# Patient Record
Sex: MALE | Employment: UNEMPLOYED | URBAN - METROPOLITAN AREA
[De-identification: names, ages, dates, MRNs, and addresses within clinical notes are randomized per-mention and may not be internally consistent; named-entity substitution may affect disease eponyms.]

---

## 2018-12-27 ENCOUNTER — APPOINTMENT (OUTPATIENT)
Dept: GENERAL RADIOLOGY | Age: 53
DRG: 897 | End: 2018-12-27
Attending: EMERGENCY MEDICINE
Payer: MEDICARE

## 2018-12-27 ENCOUNTER — APPOINTMENT (OUTPATIENT)
Dept: CT IMAGING | Age: 53
DRG: 897 | End: 2018-12-27
Attending: EMERGENCY MEDICINE
Payer: MEDICARE

## 2018-12-27 ENCOUNTER — HOSPITAL ENCOUNTER (INPATIENT)
Age: 53
LOS: 8 days | Discharge: NURSING, REHAB OR LONG-TERM CARE | DRG: 897 | End: 2019-01-04
Attending: EMERGENCY MEDICINE | Admitting: INTERNAL MEDICINE
Payer: MEDICARE

## 2018-12-27 DIAGNOSIS — R55 SYNCOPE AND COLLAPSE: Primary | ICD-10-CM

## 2018-12-27 DIAGNOSIS — G51.0 FACIAL NERVE PALSY: ICD-10-CM

## 2018-12-27 DIAGNOSIS — R56.9 SEIZURE-LIKE ACTIVITY (HCC): ICD-10-CM

## 2018-12-27 DIAGNOSIS — R79.89 ELEVATED LACTIC ACID LEVEL: ICD-10-CM

## 2018-12-27 DIAGNOSIS — K70.10 ALCOHOLIC HEPATITIS, UNSPECIFIED WHETHER ASCITES PRESENT: ICD-10-CM

## 2018-12-27 DIAGNOSIS — M62.82 NON-TRAUMATIC RHABDOMYOLYSIS: ICD-10-CM

## 2018-12-27 DIAGNOSIS — J96.01 ACUTE RESPIRATORY FAILURE WITH HYPOXIA (HCC): ICD-10-CM

## 2018-12-27 DIAGNOSIS — F10.921 ALCOHOL INTOXICATION WITH DELIRIUM (HCC): ICD-10-CM

## 2018-12-27 PROBLEM — F10.221 ALCOHOL INTOXICATION DELIRIUM WITH MODERATE OR SEVERE USE DISORDER (HCC): Status: ACTIVE | Noted: 2018-12-27

## 2018-12-27 PROBLEM — N18.30 STAGE 3 CHRONIC KIDNEY DISEASE (HCC): Status: ACTIVE | Noted: 2018-12-27

## 2018-12-27 LAB
ALBUMIN SERPL-MCNC: 3.7 G/DL (ref 3.5–5)
ALBUMIN/GLOB SERPL: 0.7 {RATIO} (ref 1.1–2.2)
ALP SERPL-CCNC: 76 U/L (ref 45–117)
ALT SERPL-CCNC: 46 U/L (ref 12–78)
AMPHET UR QL SCN: NEGATIVE
ANION GAP SERPL CALC-SCNC: 13 MMOL/L (ref 5–15)
APPEARANCE UR: CLEAR
AST SERPL-CCNC: 139 U/L (ref 15–37)
BACTERIA URNS QL MICRO: NEGATIVE /HPF
BARBITURATES UR QL SCN: NEGATIVE
BASOPHILS # BLD: 0 K/UL (ref 0–0.1)
BASOPHILS NFR BLD: 1 % (ref 0–1)
BENZODIAZ UR QL: NEGATIVE
BILIRUB SERPL-MCNC: 0.4 MG/DL (ref 0.2–1)
BILIRUB UR QL: NEGATIVE
BUN SERPL-MCNC: 14 MG/DL (ref 6–20)
BUN/CREAT SERPL: 11 (ref 12–20)
CALCIUM SERPL-MCNC: 8.8 MG/DL (ref 8.5–10.1)
CANNABINOIDS UR QL SCN: POSITIVE
CHLORIDE SERPL-SCNC: 96 MMOL/L (ref 97–108)
CK MB CFR SERPL CALC: 0.2 % (ref 0–2.5)
CK MB SERPL-MCNC: 2.4 NG/ML (ref 5–25)
CK SERPL-CCNC: 1273 U/L (ref 39–308)
CO2 SERPL-SCNC: 27 MMOL/L (ref 21–32)
COCAINE UR QL SCN: NEGATIVE
COLOR UR: ABNORMAL
CREAT SERPL-MCNC: 1.3 MG/DL (ref 0.7–1.3)
DIFFERENTIAL METHOD BLD: ABNORMAL
DRUG SCRN COMMENT,DRGCM: ABNORMAL
EOSINOPHIL # BLD: 0 K/UL (ref 0–0.4)
EOSINOPHIL NFR BLD: 0 % (ref 0–7)
EPITH CASTS URNS QL MICRO: ABNORMAL /LPF
ERYTHROCYTE [DISTWIDTH] IN BLOOD BY AUTOMATED COUNT: 15.4 % (ref 11.5–14.5)
ETHANOL SERPL-MCNC: 435 MG/DL
GLOBULIN SER CALC-MCNC: 5 G/DL (ref 2–4)
GLUCOSE SERPL-MCNC: 79 MG/DL (ref 65–100)
GLUCOSE UR STRIP.AUTO-MCNC: NEGATIVE MG/DL
HCT VFR BLD AUTO: 34.4 % (ref 36.6–50.3)
HGB BLD-MCNC: 11.5 G/DL (ref 12.1–17)
HGB UR QL STRIP: ABNORMAL
IMM GRANULOCYTES # BLD: 0 K/UL (ref 0–0.04)
IMM GRANULOCYTES NFR BLD AUTO: 0 % (ref 0–0.5)
KETONES UR QL STRIP.AUTO: NEGATIVE MG/DL
LACTATE SERPL-SCNC: 2.6 MMOL/L (ref 0.4–2)
LACTATE SERPL-SCNC: 3.2 MMOL/L (ref 0.4–2)
LACTATE SERPL-SCNC: 4.2 MMOL/L (ref 0.4–2)
LEUKOCYTE ESTERASE UR QL STRIP.AUTO: NEGATIVE
LIPASE SERPL-CCNC: 122 U/L (ref 73–393)
LYMPHOCYTES # BLD: 1.2 K/UL (ref 0.8–3.5)
LYMPHOCYTES NFR BLD: 44 % (ref 12–49)
MAGNESIUM SERPL-MCNC: 1.6 MG/DL (ref 1.6–2.4)
MCH RBC QN AUTO: 29.9 PG (ref 26–34)
MCHC RBC AUTO-ENTMCNC: 33.4 G/DL (ref 30–36.5)
MCV RBC AUTO: 89.4 FL (ref 80–99)
METHADONE UR QL: NEGATIVE
MONOCYTES # BLD: 0.4 K/UL (ref 0–1)
MONOCYTES NFR BLD: 13 % (ref 5–13)
NEUTS SEG # BLD: 1.2 K/UL (ref 1.8–8)
NEUTS SEG NFR BLD: 42 % (ref 32–75)
NITRITE UR QL STRIP.AUTO: NEGATIVE
NRBC # BLD: 0 K/UL (ref 0–0.01)
NRBC BLD-RTO: 0 PER 100 WBC
OPIATES UR QL: NEGATIVE
PCP UR QL: NEGATIVE
PH UR STRIP: 5 [PH] (ref 5–8)
PLATELET # BLD AUTO: 151 K/UL (ref 150–400)
POTASSIUM SERPL-SCNC: 4 MMOL/L (ref 3.5–5.1)
PROT SERPL-MCNC: 8.7 G/DL (ref 6.4–8.2)
PROT UR STRIP-MCNC: 100 MG/DL
RBC # BLD AUTO: 3.85 M/UL (ref 4.1–5.7)
RBC #/AREA URNS HPF: ABNORMAL /HPF (ref 0–5)
SODIUM SERPL-SCNC: 136 MMOL/L (ref 136–145)
SP GR UR REFRACTOMETRY: 1.01 (ref 1–1.03)
TROPONIN I SERPL-MCNC: <0.05 NG/ML
UA: UC IF INDICATED,UAUC: ABNORMAL
UROBILINOGEN UR QL STRIP.AUTO: 0.2 EU/DL (ref 0.2–1)
WBC # BLD AUTO: 2.8 K/UL (ref 4.1–11.1)
WBC URNS QL MICRO: ABNORMAL /HPF (ref 0–4)

## 2018-12-27 PROCEDURE — 94761 N-INVAS EAR/PLS OXIMETRY MLT: CPT

## 2018-12-27 PROCEDURE — 81001 URINALYSIS AUTO W/SCOPE: CPT

## 2018-12-27 PROCEDURE — 74011250636 HC RX REV CODE- 250/636: Performed by: INTERNAL MEDICINE

## 2018-12-27 PROCEDURE — 83735 ASSAY OF MAGNESIUM: CPT

## 2018-12-27 PROCEDURE — 83605 ASSAY OF LACTIC ACID: CPT

## 2018-12-27 PROCEDURE — 77010033678 HC OXYGEN DAILY

## 2018-12-27 PROCEDURE — 82550 ASSAY OF CK (CPK): CPT

## 2018-12-27 PROCEDURE — 74011250636 HC RX REV CODE- 250/636: Performed by: EMERGENCY MEDICINE

## 2018-12-27 PROCEDURE — 96361 HYDRATE IV INFUSION ADD-ON: CPT

## 2018-12-27 PROCEDURE — 70450 CT HEAD/BRAIN W/O DYE: CPT

## 2018-12-27 PROCEDURE — 83690 ASSAY OF LIPASE: CPT

## 2018-12-27 PROCEDURE — 65660000001 HC RM ICU INTERMED STEPDOWN

## 2018-12-27 PROCEDURE — 84484 ASSAY OF TROPONIN QUANT: CPT

## 2018-12-27 PROCEDURE — 96375 TX/PRO/DX INJ NEW DRUG ADDON: CPT

## 2018-12-27 PROCEDURE — 36415 COLL VENOUS BLD VENIPUNCTURE: CPT

## 2018-12-27 PROCEDURE — 99285 EMERGENCY DEPT VISIT HI MDM: CPT

## 2018-12-27 PROCEDURE — 96365 THER/PROPH/DIAG IV INF INIT: CPT

## 2018-12-27 PROCEDURE — 96366 THER/PROPH/DIAG IV INF ADDON: CPT

## 2018-12-27 PROCEDURE — 74011000250 HC RX REV CODE- 250: Performed by: EMERGENCY MEDICINE

## 2018-12-27 PROCEDURE — 85025 COMPLETE CBC W/AUTO DIFF WBC: CPT

## 2018-12-27 PROCEDURE — 82553 CREATINE MB FRACTION: CPT

## 2018-12-27 PROCEDURE — 80307 DRUG TEST PRSMV CHEM ANLYZR: CPT

## 2018-12-27 PROCEDURE — 93005 ELECTROCARDIOGRAM TRACING: CPT

## 2018-12-27 PROCEDURE — 71045 X-RAY EXAM CHEST 1 VIEW: CPT

## 2018-12-27 PROCEDURE — 80053 COMPREHEN METABOLIC PANEL: CPT

## 2018-12-27 PROCEDURE — 74011250637 HC RX REV CODE- 250/637: Performed by: INTERNAL MEDICINE

## 2018-12-27 RX ORDER — LORAZEPAM 2 MG/ML
4 INJECTION INTRAMUSCULAR
Status: DISCONTINUED | OUTPATIENT
Start: 2018-12-27 | End: 2019-01-04 | Stop reason: HOSPADM

## 2018-12-27 RX ORDER — LORAZEPAM 2 MG/ML
2 INJECTION INTRAMUSCULAR
Status: DISCONTINUED | OUTPATIENT
Start: 2018-12-27 | End: 2019-01-04 | Stop reason: HOSPADM

## 2018-12-27 RX ORDER — LANOLIN ALCOHOL/MO/W.PET/CERES
100 CREAM (GRAM) TOPICAL DAILY
Status: DISCONTINUED | OUTPATIENT
Start: 2018-12-28 | End: 2019-01-04 | Stop reason: HOSPADM

## 2018-12-27 RX ORDER — THERA TABS 400 MCG
1 TAB ORAL DAILY
Status: DISCONTINUED | OUTPATIENT
Start: 2018-12-28 | End: 2019-01-04 | Stop reason: HOSPADM

## 2018-12-27 RX ORDER — FOLIC ACID 1 MG/1
1 TABLET ORAL DAILY
Status: DISCONTINUED | OUTPATIENT
Start: 2018-12-28 | End: 2019-01-04 | Stop reason: HOSPADM

## 2018-12-27 RX ORDER — GABAPENTIN 300 MG/1
300 CAPSULE ORAL 3 TIMES DAILY
Status: DISCONTINUED | OUTPATIENT
Start: 2018-12-27 | End: 2019-01-04 | Stop reason: HOSPADM

## 2018-12-27 RX ORDER — KETOROLAC TROMETHAMINE 30 MG/ML
15 INJECTION, SOLUTION INTRAMUSCULAR; INTRAVENOUS
Status: COMPLETED | OUTPATIENT
Start: 2018-12-27 | End: 2018-12-27

## 2018-12-27 RX ORDER — SODIUM CHLORIDE 9 MG/ML
100 INJECTION, SOLUTION INTRAVENOUS CONTINUOUS
Status: DISCONTINUED | OUTPATIENT
Start: 2018-12-27 | End: 2018-12-29

## 2018-12-27 RX ORDER — SODIUM CHLORIDE 0.9 % (FLUSH) 0.9 %
5-10 SYRINGE (ML) INJECTION EVERY 8 HOURS
Status: DISCONTINUED | OUTPATIENT
Start: 2018-12-27 | End: 2019-01-04 | Stop reason: HOSPADM

## 2018-12-27 RX ORDER — SODIUM CHLORIDE 0.9 % (FLUSH) 0.9 %
5-10 SYRINGE (ML) INJECTION AS NEEDED
Status: DISCONTINUED | OUTPATIENT
Start: 2018-12-27 | End: 2019-01-04 | Stop reason: HOSPADM

## 2018-12-27 RX ORDER — ENOXAPARIN SODIUM 100 MG/ML
40 INJECTION SUBCUTANEOUS EVERY 24 HOURS
Status: DISCONTINUED | OUTPATIENT
Start: 2018-12-27 | End: 2019-01-04 | Stop reason: HOSPADM

## 2018-12-27 RX ADMIN — LORAZEPAM 2 MG: 2 INJECTION, SOLUTION INTRAMUSCULAR; INTRAVENOUS at 20:58

## 2018-12-27 RX ADMIN — LORAZEPAM 2 MG: 2 INJECTION, SOLUTION INTRAMUSCULAR; INTRAVENOUS at 23:59

## 2018-12-27 RX ADMIN — SODIUM CHLORIDE 1000 ML: 900 INJECTION, SOLUTION INTRAVENOUS at 10:44

## 2018-12-27 RX ADMIN — GABAPENTIN 300 MG: 300 CAPSULE ORAL at 16:04

## 2018-12-27 RX ADMIN — KETOROLAC TROMETHAMINE 15 MG: 30 INJECTION, SOLUTION INTRAMUSCULAR; INTRAVENOUS at 11:29

## 2018-12-27 RX ADMIN — ENOXAPARIN SODIUM 40 MG: 40 INJECTION, SOLUTION INTRAVENOUS; SUBCUTANEOUS at 20:26

## 2018-12-27 RX ADMIN — THIAMINE HYDROCHLORIDE: 100 INJECTION, SOLUTION INTRAMUSCULAR; INTRAVENOUS at 11:55

## 2018-12-27 RX ADMIN — GABAPENTIN 300 MG: 300 CAPSULE ORAL at 20:26

## 2018-12-27 RX ADMIN — SODIUM CHLORIDE 100 ML/HR: 900 INJECTION, SOLUTION INTRAVENOUS at 20:26

## 2018-12-27 RX ADMIN — Medication 10 ML: at 20:13

## 2018-12-27 RX ADMIN — SODIUM CHLORIDE 1000 ML: 900 INJECTION, SOLUTION INTRAVENOUS at 15:24

## 2018-12-27 NOTE — ED NOTES
Attempted to call report to [address], RN on second floor. Nurse unable to take report at this time.

## 2018-12-27 NOTE — H&P
Hospitalist Admission Note    NAME: Cortney Nunes   :  1965   MRN:  [id number]   Room Number: ER06/06  @ [facility]     Date/Time:  2018 3:04 PM    Patient PCP: Wali Victor MD  ______________________________________________________________________  Given the patient's current clinical presentation, I have a high level of concern for decompensation if discharged from the emergency department. Complex decision making was performed, which includes reviewing the patient's available past medical records, laboratory results, and x-ray films. My assessment of this patient's clinical condition and my plan of care is as follows. Assessment / Plan:    Alcohol intoxication delirium with moderate or severe use disorder:POA  Alcohol hepatitis  CIWA protocol, MVI  Gabapentin for augmentation  Will  when patient is more alert/awake  Risk for alcohol withdrawal seizures  PRN Ativan    Syncopal episode  Multiple falls per family  Mild left nasolabial droop, unclear whether this is patient's baseline or something new  Head CT- unremarkable  Monitor on telemetry,check orthostatics  Consult neurology  Defer MRI/MRA to neurology    Lactic acidosis/starvation ketosis  LA 4.3,c/w ivf  Recheck LA    Nontraumatic rhabdomyolysis  Monitor CK, aggressive fluid hydration    CKD, stage III  Avoid nephrotoxins    Marijuana abuse  UDS positive for THC     Body mass index is 25.4 kg/m². Code Status: full  Surrogate Decision Maker:niece    DVT Prophylaxis: Lovenox  GI Prophylaxis: not indicated    Baseline: lives with sister        Subjective:   CHIEF COMPLAINT: falls    HISTORY OF PRESENT ILLNESS:     Maciej Wolf is a 52 y.o. male with PMH of alcohol dependence  who presents to ED with c/o above. Please note patient is a poor historian and history was mainly taken from ED physician and niece at bedside.   Per report patient has been having chronic pain all over and witnessed syncopal episodes twice in the past few weeks. Niece declines noticing any chronic jerking movements, bowel or bladder incontinence or tongue bite. During my interview, patient does admit to drinking 4-5 beers every day with the last drink this morning. He denies smoking cigarettes but does smoke marijuana. He denies fever or chills. He does have a remote history of alcohol withdrawal seizures. In ED, CT head was done which was unremarkable labs were remarkable for elevated CK and lactic acidosis. We were asked to admit for work up and evaluation of the above problems. Past Medical History:   Diagnosis Date    Alcohol abuse     Asthma     Hypertension         History reviewed. No pertinent surgical history. Social History     Tobacco Use    Smoking status: Current Every Day Smoker    Smokeless tobacco: Never Used   Substance Use Topics    Alcohol use: Yes        History reviewed. No pertinent family history. Allergies   Allergen Reactions    Aspirin Nausea and Vomiting        Prior to Admission medications    Medication Sig Start Date End Date Taking? Authorizing Provider   ATORVASTATIN CALCIUM (LIPITOR PO) Take  by mouth. Uli Argueta MD   POTASSIUM (POTASSIMIN PO) Take  by mouth. Uli Argueta MD       REVIEW OF SYSTEMS:     I am not able to complete the review of systems because:    The patient is intubated and sedated    The patient has altered mental status due to his acute medical problems    The patient has baseline aphasia from prior stroke(s)    The patient has baseline dementia and is not reliable historian    The patient is in acute medical distress and unable to provide information           Total of 12 systems reviewed as follows:       POSITIVE= underlined text  Negative = text not underlined  General:  fever, chills, sweats, generalized weakness, weight loss/gain,      loss of appetite   Eyes:    blurred vision, eye pain, loss of vision, double vision  ENT:    rhinorrhea, pharyngitis   Respiratory:   cough, sputum production, SOB, ACEVEDO, wheezing, pleuritic pain   Cardiology:   chest pain, palpitations, orthopnea, PND, edema, syncope   Gastrointestinal:  abdominal pain , N/V, diarrhea, dysphagia, constipation, bleeding   Genitourinary:  frequency, urgency, dysuria, hematuria, incontinence   Muskuloskeletal :  arthralgia, myalgia, back pain  Hematology:  easy bruising, nose or gum bleeding, lymphadenopathy   Dermatological: rash, ulceration, pruritis, color change / jaundice  Endocrine:   hot flashes or polydipsia   Neurological:  headache, dizziness, confusion, focal weakness, paresthesia,     Speech difficulties, memory loss, gait difficulty  Psychological: Feelings of anxiety, depression, agitation    Objective:   VITALS:    Visit Vitals  /83 (BP 1 Location: Right arm, BP Patient Position: At rest)   Pulse 82   Temp 98.2 °F (36.8 °C)   Resp 16   Ht 5' 9\" (1.753 m)   Wt 78 kg (172 lb)   SpO2 100%   BMI 25.40 kg/m²       PHYSICAL EXAM:    General:    Alert, cooperative, no distress, appears stated age. HEENT: Atraumatic, anicteric sclerae, pink conjunctivae     No oral ulcers, mucosa moist, throat clear, dentition fair  Neck:  Supple, symmetrical,  thyroid: non tender  Lungs:   Clear to auscultation bilaterally. No Wheezing or Rhonchi. No rales. Chest wall:  No tenderness  No Accessory muscle use. Heart:   Regular  rhythm,  No  murmur   No edema  Abdomen:   Soft, non-tender. Not distended. Bowel sounds normal,umblical hernia+  Extremities: No cyanosis. No clubbing,      Skin turgor normal, Capillary refill normal, Radial dial pulse 2+  Skin:     Not pale. Not Jaundiced  No rashes   Psych:  poor insight. Not depressed. Not anxious or agitated. Neurologic: EOMs intact. No facial asymmetry. No aphasia or slurred speech. Symmetrical strength, Sensation grossly intact. Alert and oriented X 2. Mild left nasolabial drop?  At baseline    ______________________________________________________________________    Care Plan discussed with:  Patient/Family, Nurse and     Expected  Disposition:  tbd  ________________________________________________________________________  TOTAL TIME:  78 Minutes    Critical Care Provided     Minutes non procedure based      Comments    x Reviewed previous records   >50% of visit spent in counseling and coordination of care  Discussion with patient and/or family and questions answered       ________________________________________________________________________  Signed: Bryan Douglas MD    Procedures: see electronic medical records for all procedures/Xrays and details which were not copied into this note but were reviewed prior to creation of Plan.     LAB DATA REVIEWED:    Recent Results (from the past 24 hour(s))   EKG, 12 LEAD, INITIAL    Collection Time: 12/27/18 10:32 AM   Result Value Ref Range    Ventricular Rate 93 BPM    Atrial Rate 93 BPM    P-R Interval 180 ms    QRS Duration 80 ms    Q-T Interval 376 ms    QTC Calculation (Bezet) 467 ms    Calculated P Axis 40 degrees    Calculated R Axis 40 degrees    Calculated T Axis 52 degrees    Diagnosis       Normal sinus rhythm  Normal ECG  When compared with ECG of 02-AUG-2010 08:06,  ST no longer depressed in Anterior leads  Nonspecific T wave abnormality no longer evident in Inferior leads     CBC WITH AUTOMATED DIFF    Collection Time: 12/27/18 10:39 AM   Result Value Ref Range    WBC 2.8 (L) 4.1 - 11.1 K/uL    RBC 3.85 (L) 4.10 - 5.70 M/uL    HGB 11.5 (L) 12.1 - 17.0 g/dL    HCT 34.4 (L) 36.6 - 50.3 %    MCV 89.4 80.0 - 99.0 FL    MCH 29.9 26.0 - 34.0 PG    MCHC 33.4 30.0 - 36.5 g/dL    RDW 15.4 (H) 11.5 - 14.5 %    PLATELET 234 250 - 327 K/uL    NRBC 0.0 0  WBC    ABSOLUTE NRBC 0.00 0.00 - 0.01 K/uL    NEUTROPHILS 42 32 - 75 %    LYMPHOCYTES 44 12 - 49 %    MONOCYTES 13 5 - 13 %    EOSINOPHILS 0 0 - 7 %    BASOPHILS 1 0 - 1 % IMMATURE GRANULOCYTES 0 0.0 - 0.5 %    ABS. NEUTROPHILS 1.2 (L) 1.8 - 8.0 K/UL    ABS. LYMPHOCYTES 1.2 0.8 - 3.5 K/UL    ABS. MONOCYTES 0.4 0.0 - 1.0 K/UL    ABS. EOSINOPHILS 0.0 0.0 - 0.4 K/UL    ABS. BASOPHILS 0.0 0.0 - 0.1 K/UL    ABS. IMM. GRANS. 0.0 0.00 - 0.04 K/UL    DF AUTOMATED     METABOLIC PANEL, COMPREHENSIVE    Collection Time: 12/27/18 10:39 AM   Result Value Ref Range    Sodium 136 136 - 145 mmol/L    Potassium 4.0 3.5 - 5.1 mmol/L    Chloride 96 (L) 97 - 108 mmol/L    CO2 27 21 - 32 mmol/L    Anion gap 13 5 - 15 mmol/L    Glucose 79 65 - 100 mg/dL    BUN 14 6 - 20 MG/DL    Creatinine 1.30 0.70 - 1.30 MG/DL    BUN/Creatinine ratio 11 (L) 12 - 20      GFR est AA >60 >60 ml/min/1.73m2    GFR est non-AA 57 (L) >60 ml/min/1.73m2    Calcium 8.8 8.5 - 10.1 MG/DL    Bilirubin, total 0.4 0.2 - 1.0 MG/DL    ALT (SGPT) 46 12 - 78 U/L    AST (SGOT) 139 (H) 15 - 37 U/L    Alk.  phosphatase 76 45 - 117 U/L    Protein, total 8.7 (H) 6.4 - 8.2 g/dL    Albumin 3.7 3.5 - 5.0 g/dL    Globulin 5.0 (H) 2.0 - 4.0 g/dL    A-G Ratio 0.7 (L) 1.1 - 2.2     LIPASE    Collection Time: 12/27/18 10:39 AM   Result Value Ref Range    Lipase 122 73 - 393 U/L   ETHYL ALCOHOL    Collection Time: 12/27/18 10:39 AM   Result Value Ref Range    ALCOHOL(ETHYL),SERUM 435 (H) <10 MG/DL   DRUG SCREEN, URINE    Collection Time: 12/27/18 10:39 AM   Result Value Ref Range    AMPHETAMINES NEGATIVE  NEG      BARBITURATES NEGATIVE  NEG      BENZODIAZEPINES NEGATIVE  NEG      COCAINE NEGATIVE  NEG      METHADONE NEGATIVE  NEG      OPIATES NEGATIVE  NEG      PCP(PHENCYCLIDINE) NEGATIVE  NEG      THC (TH-CANNABINOL) POSITIVE (A) NEG      Drug screen comment (NOTE)    URINALYSIS W/ REFLEX CULTURE    Collection Time: 12/27/18 10:39 AM   Result Value Ref Range    Color YELLOW/STRAW      Appearance CLEAR CLEAR      Specific gravity 1.010 1.003 - 1.030      pH (UA) 5.0 5.0 - 8.0      Protein 100 (A) NEG mg/dL    Glucose NEGATIVE  NEG mg/dL    Ketone NEGATIVE  NEG mg/dL    Bilirubin NEGATIVE  NEG      Blood TRACE (A) NEG      Urobilinogen 0.2 0.2 - 1.0 EU/dL    Nitrites NEGATIVE  NEG      Leukocyte Esterase NEGATIVE  NEG      WBC 0-4 0 - 4 /hpf    RBC 0-5 0 - 5 /hpf    Epithelial cells FEW FEW /lpf    Bacteria NEGATIVE  NEG /hpf    UA:UC IF INDICATED CULTURE NOT INDICATED BY UA RESULT CNI     TROPONIN I    Collection Time: 12/27/18 10:39 AM   Result Value Ref Range    Troponin-I, Qt. <0.05 <0.05 ng/mL   LACTIC ACID    Collection Time: 12/27/18 10:39 AM   Result Value Ref Range    Lactic acid 4.2 (HH) 0.4 - 2.0 MMOL/L   CK W/ REFLX CKMB    Collection Time: 12/27/18 10:39 AM   Result Value Ref Range    CK 1,273 (H) 39 - 308 U/L   CK-MB,QUANT.     Collection Time: 12/27/18 10:39 AM   Result Value Ref Range    CK - MB 2.4 <3.6 NG/ML    CK-MB Index 0.2 0.0 - 2.5

## 2018-12-27 NOTE — ED TRIAGE NOTES
C/o chronic \"pain all over\" x \"since I can remember\" with possible fall/syncope episodes x 2-3 weeks. Pt reported \"sometimes I almost pass out when i'm in pain and trying to crawl up the stairs. \" also pt's niece reported that \"your uncle is passed out\" after hanging with friends.

## 2018-12-27 NOTE — ROUTINE PROCESS
TRANSFER - OUT REPORT:    Verbal report given to REAGAN Zazueta(name) on Hari Dubose  being transferred to PCU 3(unit) for routine progression of care       Report consisted of patients Situation, Background, Assessment and   Recommendations(SBAR). Information from the following report(s) SBAR, ED Summary, [facility] and Recent Results was reviewed with the receiving nurse. Lines:   Peripheral IV 12/27/18 Right Antecubital (Active)   Site Assessment Clean, dry, & intact 12/27/2018 10:37 AM   Phlebitis Assessment 0 12/27/2018 10:37 AM   Infiltration Assessment 0 12/27/2018 10:37 AM   Dressing Status Clean, dry, & intact 12/27/2018 10:37 AM   Dressing Type Transparent 12/27/2018 10:37 AM   Hub Color/Line Status Pink;Flushed 12/27/2018 10:37 AM   Action Taken Blood drawn 12/27/2018 10:37 AM        Opportunity for questions and clarification was provided.       Patient transported with:   Registered Nurse

## 2018-12-27 NOTE — PROGRESS NOTES
CM reviewed chart. CM met with pt for assessment. Pt lives with his niece Tavares Pelaez, who also makes his medical decisions in case of an emergency- per pt, at [facility] [place], [place]. Pt arrived to the ED by his great niece, Norman Nunes [phone]- she can provide phone number for her mother, JUAN IBARRA. Pt reports he has not seen his PCP in years- is willing to have a new PCP set up here at [facility]. Pt has done pt in the past at [facility]- years ago. Pt confirmed he is independent at home.      Care Management Interventions  PCP Verified by CM: Yes(hasn't seen in years )  Transition of Care Consult (CM Consult): Discharge Planning  Current Support Network: Relative's Home(pt lives with bishop and great niece )  Confirm Follow Up Transport: Family(Niece/ great niece will  )  Discharge Location  Discharge Placement: Transferred to higher level of care      [facility] MSW, QMHP

## 2018-12-27 NOTE — ED NOTES
Pt reports chronic generalized body pain with possible syncopal episodes over past few weeks and worsening. Pt also admits to drinking beer. Pt has bruising with abrasion noted to bridge of nose.

## 2018-12-27 NOTE — ED NOTES
Emergency Department Nursing Plan of Care       The Nursing Plan of Care is developed from the Nursing assessment and Emergency Department Attending provider initial evaluation. The plan of care may be reviewed in the ED Provider note.     The Plan of Care was developed with the following considerations:   Patient / Family readiness to learn indicated by:verbalized understanding  Persons(s) to be included in education: patient  Barriers to Learning/Limitations:No    Signed     Yin Phan    12/27/2018   9:48 AM.

## 2018-12-27 NOTE — ED PROVIDER NOTES
EMERGENCY DEPARTMENT HISTORY AND PHYSICAL EXAM      Date: 12/27/2018  Patient Name: Burgess Scruggs  History of Presenting Illness     Chief Complaint   Patient presents with    Fall    Generalized Body Aches       History Provided By: Patient and Patient's niece    HPI: Burgess Scruggs, 52 y.o. male with PMHx significant for HTN, EtOH abuse, asthma, presents ambulatory to the ED with cc of chronic \"pain all over\" as well as possible GLF's/syncopal episodes x 3 weeks. Per niece, the pt currently lives with her mother who reported that the pt has been \"randomly passing out. \" When asking pt why he is in ED he states \"I'm here for pain and dysfunction. \" Niece reports pt is able to ambulate. She notes that the pt drinks alcohol \"a few times a week. \" Per niece, the pt has not taken any medication for his current pain. She endorses that the pt has had \"1 or 2 strokes. \" Additionally, pt specifically denies any recent fever, chills, headache, nausea, vomiting, diarrhea, abdominal pain, CP, SOB, lightheadedness, dizziness, numbness, weakness, tingling, BLE swelling, heart palpitations, urinary sxs, changes in BM, changes in PO intake, melena, hematochezia, cough, or congestion. Of note, pt and pt's niece are poor historians. History limited due to patient's acute delirium and alcohol intoxication. PCP: Maico Kumar MD    PMHx: Significant for HTN, EtOH abuse, asthma  Social Hx: + tobacco (daily), + EtOH, - Illicit Drugs       Past History   Past Medical History:  Past Medical History:   Diagnosis Date    Alcohol abuse     Asthma     Hypertension        Past Surgical History:  History reviewed. No pertinent surgical history. Family History:  History reviewed. No pertinent family history. Social History:  Social History     Tobacco Use    Smoking status: Current Every Day Smoker    Smokeless tobacco: Never Used   Substance Use Topics    Alcohol use: Yes    Drug use:  No Allergies: Allergies   Allergen Reactions    Aspirin Nausea and Vomiting       Medications:  No current facility-administered medications on file prior to encounter. Current Outpatient Medications on File Prior to Encounter   Medication Sig Dispense Refill    ATORVASTATIN CALCIUM (LIPITOR PO) Take  by mouth.  POTASSIUM (POTASSIMIN PO) Take  by mouth. Review of Systems   Review of Systems   Constitutional: Negative. Negative for chills and fever. HENT: Negative. Negative for congestion, facial swelling, rhinorrhea, sore throat, trouble swallowing and voice change. Eyes: Negative. Respiratory: Negative. Negative for apnea, cough, chest tightness, shortness of breath and wheezing. Cardiovascular: Negative. Negative for chest pain, palpitations and leg swelling. Gastrointestinal: Negative. Negative for abdominal distention, abdominal pain, blood in stool, constipation, diarrhea, nausea and vomiting. Endocrine: Negative. Negative for cold intolerance, heat intolerance and polyuria. Genitourinary: Negative. Negative for difficulty urinating, dysuria, flank pain, frequency, hematuria and urgency. Musculoskeletal: Positive for myalgias. Negative for arthralgias, back pain, neck pain and neck stiffness. Skin: Negative. Negative for color change and rash. Neurological: Positive for syncope. Negative for dizziness, facial asymmetry, speech difficulty, weakness, light-headedness, numbness and headaches. Hematological: Negative. Does not bruise/bleed easily. Psychiatric/Behavioral: Negative. Negative for confusion and self-injury. The patient is not nervous/anxious. Physical Exam   Physical Exam   Constitutional: He appears lethargic. He appears toxic. He has a sickly appearance. He appears ill. He appears distressed. HENT:   Head: Normocephalic and atraumatic. Mouth/Throat: Mucous membranes are normal. No posterior oropharyngeal erythema.    Nose: Abrasion over dorsum of nose  Mouth/throat: No ecchymosis to tongue   Eyes: Conjunctivae and EOM are normal. Pupils are equal, round, and reactive to light. Neck: Normal range of motion. Cardiovascular: Normal rate, regular rhythm, normal heart sounds and intact distal pulses. Exam reveals no gallop and no friction rub. No murmur heard. Pulmonary/Chest: Effort normal and breath sounds normal. No respiratory distress. He has no wheezes. He has no rales. He exhibits no tenderness. Abdominal: Soft. Bowel sounds are normal. He exhibits no distension and no mass. There is no tenderness. There is no rebound and no guarding. A hernia is present. Reducible umbilical hernia   Musculoskeletal: Normal range of motion. He exhibits no edema, tenderness or deformity. Neurological: He appears lethargic. He displays normal reflexes. No cranial nerve deficit. He exhibits normal muscle tone. Coordination normal.   Follows commands intermittently, baseline mental exam per niece, left facial droop, facial palsy involves left upper brow and nasal groove; equal 5/5 strength BUE, BLE   Skin: Skin is warm. No rash noted. Psychiatric: He has a normal mood and affect. Nursing note and vitals reviewed.     Diagnostic Study Results     Labs -  Recent Results (from the past 24 hour(s))   EKG, 12 LEAD, INITIAL    Collection Time: 12/27/18 10:32 AM   Result Value Ref Range    Ventricular Rate 93 BPM    Atrial Rate 93 BPM    P-R Interval 180 ms    QRS Duration 80 ms    Q-T Interval 376 ms    QTC Calculation (Bezet) 467 ms    Calculated P Axis 40 degrees    Calculated R Axis 40 degrees    Calculated T Axis 52 degrees    Diagnosis       Normal sinus rhythm  Normal ECG  When compared with ECG of 02-AUG-2010 08:06,  ST no longer depressed in Anterior leads  Nonspecific T wave abnormality no longer evident in Inferior leads     CBC WITH AUTOMATED DIFF    Collection Time: 12/27/18 10:39 AM   Result Value Ref Range    WBC 2.8 (L) 4.1 - 11.1 K/uL RBC 3.85 (L) 4.10 - 5.70 M/uL    HGB 11.5 (L) 12.1 - 17.0 g/dL    HCT 34.4 (L) 36.6 - 50.3 %    MCV 89.4 80.0 - 99.0 FL    MCH 29.9 26.0 - 34.0 PG    MCHC 33.4 30.0 - 36.5 g/dL    RDW 15.4 (H) 11.5 - 14.5 %    PLATELET 642 333 - 215 K/uL    NRBC 0.0 0  WBC    ABSOLUTE NRBC 0.00 0.00 - 0.01 K/uL    NEUTROPHILS 42 32 - 75 %    LYMPHOCYTES 44 12 - 49 %    MONOCYTES 13 5 - 13 %    EOSINOPHILS 0 0 - 7 %    BASOPHILS 1 0 - 1 %    IMMATURE GRANULOCYTES 0 0.0 - 0.5 %    ABS. NEUTROPHILS 1.2 (L) 1.8 - 8.0 K/UL    ABS. LYMPHOCYTES 1.2 0.8 - 3.5 K/UL    ABS. MONOCYTES 0.4 0.0 - 1.0 K/UL    ABS. EOSINOPHILS 0.0 0.0 - 0.4 K/UL    ABS. BASOPHILS 0.0 0.0 - 0.1 K/UL    ABS. IMM. GRANS. 0.0 0.00 - 0.04 K/UL    DF AUTOMATED     METABOLIC PANEL, COMPREHENSIVE    Collection Time: 12/27/18 10:39 AM   Result Value Ref Range    Sodium 136 136 - 145 mmol/L    Potassium 4.0 3.5 - 5.1 mmol/L    Chloride 96 (L) 97 - 108 mmol/L    CO2 27 21 - 32 mmol/L    Anion gap 13 5 - 15 mmol/L    Glucose 79 65 - 100 mg/dL    BUN 14 6 - 20 MG/DL    Creatinine 1.30 0.70 - 1.30 MG/DL    BUN/Creatinine ratio 11 (L) 12 - 20      GFR est AA >60 >60 ml/min/1.73m2    GFR est non-AA 57 (L) >60 ml/min/1.73m2    Calcium 8.8 8.5 - 10.1 MG/DL    Bilirubin, total 0.4 0.2 - 1.0 MG/DL    ALT (SGPT) 46 12 - 78 U/L    AST (SGOT) 139 (H) 15 - 37 U/L    Alk.  phosphatase 76 45 - 117 U/L    Protein, total 8.7 (H) 6.4 - 8.2 g/dL    Albumin 3.7 3.5 - 5.0 g/dL    Globulin 5.0 (H) 2.0 - 4.0 g/dL    A-G Ratio 0.7 (L) 1.1 - 2.2     LIPASE    Collection Time: 12/27/18 10:39 AM   Result Value Ref Range    Lipase 122 73 - 393 U/L   ETHYL ALCOHOL    Collection Time: 12/27/18 10:39 AM   Result Value Ref Range    ALCOHOL(ETHYL),SERUM 435 (H) <10 MG/DL   DRUG SCREEN, URINE    Collection Time: 12/27/18 10:39 AM   Result Value Ref Range    AMPHETAMINES NEGATIVE  NEG      BARBITURATES NEGATIVE  NEG      BENZODIAZEPINES NEGATIVE  NEG      COCAINE NEGATIVE  NEG      METHADONE NEGATIVE NEG      OPIATES NEGATIVE  NEG      PCP(PHENCYCLIDINE) NEGATIVE  NEG      THC (TH-CANNABINOL) POSITIVE (A) NEG      Drug screen comment (NOTE)    URINALYSIS W/ REFLEX CULTURE    Collection Time: 12/27/18 10:39 AM   Result Value Ref Range    Color YELLOW/STRAW      Appearance CLEAR CLEAR      Specific gravity 1.010 1.003 - 1.030      pH (UA) 5.0 5.0 - 8.0      Protein 100 (A) NEG mg/dL    Glucose NEGATIVE  NEG mg/dL    Ketone NEGATIVE  NEG mg/dL    Bilirubin NEGATIVE  NEG      Blood TRACE (A) NEG      Urobilinogen 0.2 0.2 - 1.0 EU/dL    Nitrites NEGATIVE  NEG      Leukocyte Esterase NEGATIVE  NEG      WBC 0-4 0 - 4 /hpf    RBC 0-5 0 - 5 /hpf    Epithelial cells FEW FEW /lpf    Bacteria NEGATIVE  NEG /hpf    UA:UC IF INDICATED CULTURE NOT INDICATED BY UA RESULT CNI     TROPONIN I    Collection Time: 12/27/18 10:39 AM   Result Value Ref Range    Troponin-I, Qt. <0.05 <0.05 ng/mL   LACTIC ACID    Collection Time: 12/27/18 10:39 AM   Result Value Ref Range    Lactic acid 4.2 (HH) 0.4 - 2.0 MMOL/L   CK W/ REFLX CKMB    Collection Time: 12/27/18 10:39 AM   Result Value Ref Range    CK 1,273 (H) 39 - 308 U/L   CK-MB,QUANT. Collection Time: 12/27/18 10:39 AM   Result Value Ref Range    CK - MB 2.4 <3.6 NG/ML    CK-MB Index 0.2 0.0 - 2.5         Radiologic Studies -   CT HEAD WO CONT   Final Result   IMPRESSION:      No interval change or acute abnormality      XR CHEST PORT   Final Result   IMPRESSION:      No acute infiltrate or overt cardiac decompensation. CT Results  (Last 48 hours)               12/27/18 1104  CT HEAD WO CONT Final result    Impression:  IMPRESSION:       No interval change or acute abnormality       Narrative:  EXAM: CT HEAD WO CONT       INDICATION: AMS       COMPARISON: 7/13/2010. CONTRAST: None. TECHNIQUE: Unenhanced CT of the head was performed using 5 mm images. Brain and   bone windows were generated.   CT dose reduction was achieved through use of a   standardized protocol tailored for this examination and automatic exposure   control for dose modulation. FINDINGS:   The ventricles and sulci are normal in size, shape and configuration and   midline. There is mild to moderate low density within the periventricular white   matter. There is no intracranial hemorrhage, extra-axial collection, mass, mass   effect or midline shift. The basilar cisterns are open. No acute infarct is   identified. The bone windows demonstrate no abnormalities. The visualized   portions of the paranasal sinuses and mastoid air cells are clear. CXR Results  (Last 48 hours)               12/27/18 1022  XR CHEST PORT Final result    Impression:  IMPRESSION:       No acute infiltrate or overt cardiac decompensation. Narrative:  INDICATION: Syncope. EXAM:       A single frontal radiograph was obtained. FINDINGS:       Previous studies: August 4, 2010. The heart is normal in size. The lungs are well expanded bilaterally without   infiltrate or pleural effusion or evidence of overt cardiac decompensation. The   visualized bony thorax is within normal limits. Medical Decision Making   I am the first provider for this patient. I reviewed the vital signs, available nursing notes, past medical history, past surgical history, family history and social history. Vital Signs-Reviewed the patient's vital signs. Patient Vitals for the past 24 hrs:   Temp Pulse Resp BP SpO2   12/27/18 1244 98.2 °F (36.8 °C) 82 16 134/83 100 %   12/27/18 1024    125/87    12/27/18 1022    139/83    12/27/18 1021    134/88    12/27/18 0955 98.1 °F (36.7 °C) 99 16 (!) 146/112 95 %   12/27/18 0952    (!) 153/100 96 %       Pulse Oximetry Analysis - 96% on RA    Cardiac Monitor:   Rate: 99 bpm  Rhythm: Normal Sinus Rhythm      ED EKG interpretation: 1035  Rhythm: normal sinus rhythm; and regular .  Rate (approx.): 93; Axis: normal; P wave: normal; QRS interval: normal ; ST/T wave: normal. This EKG was interpreted by John Velasquez M.D. Records Reviewed: Nursing Notes, Old Medical Records, Previous electrocardiograms, Previous Radiology Studies and Previous Laboratory Studies    Provider Notes (Medical Decision Making):   DDx: syncope, seizure, ETOH abuse, arrhythmia, electrolyte disturbance, head injury, head bleed     Patient presenting with altered mental status, syncope vs seizure and \"chronic pain all over\". Pt has stable vitals and POC glucose was checked immediately upon arrival. DDx: medication toxicity, infection, anemia, electrolyte/metabolic anomoly, hypercapnea, stroke/bleed/mass, dehydration, illicit drug intoxication. Will obtain labwork, UA, EKG and CT imaging of the head, chest xray. Will consider adding toxicologic workup if history unclear or warrants further investigation of toxic source. Will continue to monitor and reassess for admission. ED Course:   Initial assessment performed. The patients presenting problems have been discussed, and they are in agreement with the care plan formulated and outlined with them. I have encouraged them to ask questions as they arise throughout their visit. TOBACCO COUNSELING:   Upon evaluation, pt expressed that they are a current tobacco user. For approximately 10 minutes, pt has been counseled on the dangers of smoking and was encouraged to quit as soon as possible in order to decrease further risks to their health. Pt has conveyed their understanding of the risks involved should they continue to use tobacco products. ALCOHOL/SUBSTANCE ABUSE COUNSELING:   Upon evaluation, pt endorsed recent alcohol/illicit drug use. For approximately 15 minutes, pt has been counseled on the dangers of alcohol and illicit drug use on their health, and they were encouraged to quit as soon as possible in order to decrease further risks to their health.  Pt has conveyed their understanding of the risks involved should they continue to use these products. HYPERTENSION COUNSELING   Education was provided to the patient today regarding their hypertension. Patient is made aware of their elevated blood pressure and is instructed to follow up this week with their Primary Care for a recheck. Patient is counseled regarding consequences of chronic, uncontrolled hypertension including kidney disease, heart disease, stroke or even death. Patient states their understanding and agrees to follow up this week. Additionally, during their visit, I discussed sodium restriction, maintaining ideal body weight and regular exercise program as physiologic means to achieve blood pressure control. The patient will strive towards this. I reviewed our electronic medical record system for any past medical records that were available that may contribute to the patient's current condition, the nursing notes and vital signs from today's visit.   Chi Avelar MD    Medications Administered During ED Course:  Medications   LORazepam (ATIVAN) injection 2 mg (2 mg IntraVENous Given 12/28/18 0634)   LORazepam (ATIVAN) injection 4 mg (4 mg IntraVENous Given 12/29/18 1000)   sodium chloride (NS) flush 5-10 mL (10 mL IntraVENous Given 12/29/18 1235)   sodium chloride (NS) flush 5-10 mL (not administered)   enoxaparin (LOVENOX) injection 40 mg (40 mg SubCUTAneous Given 12/28/18 2141)   gabapentin (NEURONTIN) capsule 300 mg (300 mg Oral Given 12/29/18 0819)   thiamine HCL (B-1) tablet 100 mg (100 mg Oral Given 12/29/18 0819)   therapeutic multivitamin [facility]) tablet 1 Tab (1 Tab Oral Given 47/59/06 3742)   folic acid (FOLVITE) tablet 1 mg (1 mg Oral Given 12/29/18 0819)   PHENobarbital (LUMINAL) tablet 32.4 mg (32.4 mg Oral Given 12/29/18 1113)   LORazepam (ATIVAN) injection 4 mg (0 mg IntraVENous Held 12/29/18 1100)   ketorolac (TORADOL) injection 15 mg (15 mg IntraVENous Given 12/27/18 1129)   sodium chloride 0.9 % bolus infusion 1,000 mL (0 mL IntraVENous IV Completed 12/27/18 1144)   0.9% sodium chloride 1,000 mL with mvi, adult no. 4 with vit K 10 mL, thiamine 384 mg, folic acid 1 mg infusion ( IntraVENous IV Completed 12/27/18 1317)   sodium chloride 0.9 % bolus infusion 1,000 mL (0 mL IntraVENous IV Completed 12/28/18 1040)   labetalol (NORMODYNE;TRANDATE) 20 mg/4 mL (5 mg/mL) injection 20 mg (20 mg IntraVENous Given 12/29/18 1229)       Progress Note:  ED Course as of Dec 27 1409   Thu Dec 27, 2018   1123 Notified by RN, elevated lactic acid of 4.2; will proceed with fluid resuscitation; pt also could have a seizure. [HW]   8012 Notified by RN that pt desaturated to to 80%; pt had a brief period of unresponsiveness with blank stare; no seizure like activity; head CT negative, alcohol level at 435 so doubt withdrawal; will place on oxygen, given IVF's; anticipate admission. [HW]      ED Course User Index  [HW] Alia Hatch MD       Progress Note:  CONSULT NOTE:   2:14 Waleska Dunn MD, spoke with Luke Schuler MD,   Specialty: Hospitalist  Discussed pt's hx, disposition, and available diagnostic and imaging results. Reviewed care plans. Consultant will evaluate pt for admission. Written by Alistair Nobles.  Javi Elizabeth, ED Scribe, as dictated by Tom Ocampo MD      CRITICAL CARE NOTE :  3:10 PM    IMPENDING DETERIORATION -Airway, Respiratory, Cardiovascular, CNS, Metabolic, Renal and Hepatic  ASSOCIATED RISK FACTORS - Hypotension, Shock, Hypoxia, Dysrhythmia, Metabolic changes, Dehydration and CNS Decompensation  MANAGEMENT- Bedside Assessment and Supervision of Care  INTERPRETATION -  Xrays, CT Scan, ECG, Blood Pressure and Cardiac Output Measures   INTERVENTIONS - hemodynamic mngmt, Neurologic interventions  and Metobolic interventions  CASE REVIEW - Hospitalist, Nursing and Family  TREATMENT RESPONSE -Improved  PERFORMED BY - Self    NOTES   :  I have spent 70 minutes of critical care time involved in lab review, consultations with specialist, family decision- making, bedside attention and documentation. During this entire length of time I was immediately available to the patient . Critical Care: The reason for providing this level of medical care for this critically ill patient was due to a critical illness that impaired one or more vital organ systems, such that there was a high probability of imminent or life threatening deterioration in the patient's condition. This care involved high complexity decision making to assess, manipulate, and support vital system functions, to treat this degree of vital organ system failure, and to prevent further life threatening deterioration of the patients condition. Isis Nguyen MD      Disposition:  Admit Note:  2:14 PM  Pt is being admitted by Dr. Raghav George. The results of their tests and reason(s) for their admission have been discussed with pt and/or available family. They convey agreement and understanding for the need to be admitted and for admission diagnosis. PLAN:  1. Admit to Hospitalist  Diagnosis     Clinical Impression:   1. Syncope and collapse    2. Seizure-like activity ([address]    3. Acute respiratory failure with hypoxia (HCC)    4. Elevated lactic acid level    5. Non-traumatic rhabdomyolysis    6. Alcohol intoxication with delirium ([address]    7. Facial nerve palsy    8. Alcoholic hepatitis, unspecified whether ascites present        Attestations  This note is prepared by Ivonne Orourke, acting as Scribe for MD Isis Gonzalez MD : The scribe's documentation has been prepared under my direction and personally reviewed by me in its entirety. I confirm that the note above accurately reflects all work, treatment, procedures, and medical decision making performed by me.    :  This note will not be viewable in [address]

## 2018-12-28 LAB
ALBUMIN SERPL-MCNC: 2.7 G/DL (ref 3.5–5)
ALBUMIN/GLOB SERPL: 0.6 {RATIO} (ref 1.1–2.2)
ALP SERPL-CCNC: 89 U/L (ref 45–117)
ALT SERPL-CCNC: 38 U/L (ref 12–78)
ANION GAP SERPL CALC-SCNC: 14 MMOL/L (ref 5–15)
ANION GAP SERPL CALC-SCNC: 9 MMOL/L (ref 5–15)
AST SERPL-CCNC: 112 U/L (ref 15–37)
BILIRUB SERPL-MCNC: 0.7 MG/DL (ref 0.2–1)
BUN SERPL-MCNC: 13 MG/DL (ref 6–20)
BUN SERPL-MCNC: 15 MG/DL (ref 6–20)
BUN/CREAT SERPL: 13 (ref 12–20)
BUN/CREAT SERPL: 17 (ref 12–20)
CALCIUM SERPL-MCNC: 8.1 MG/DL (ref 8.5–10.1)
CALCIUM SERPL-MCNC: 8.3 MG/DL (ref 8.5–10.1)
CHLORIDE SERPL-SCNC: 103 MMOL/L (ref 97–108)
CHLORIDE SERPL-SCNC: 98 MMOL/L (ref 97–108)
CK SERPL-CCNC: 645 U/L (ref 39–308)
CK SERPL-CCNC: 881 U/L (ref 39–308)
CO2 SERPL-SCNC: 23 MMOL/L (ref 21–32)
CO2 SERPL-SCNC: 27 MMOL/L (ref 21–32)
CREAT SERPL-MCNC: 0.76 MG/DL (ref 0.7–1.3)
CREAT SERPL-MCNC: 1.14 MG/DL (ref 0.7–1.3)
ERYTHROCYTE [DISTWIDTH] IN BLOOD BY AUTOMATED COUNT: 15.5 % (ref 11.5–14.5)
GLOBULIN SER CALC-MCNC: 4.3 G/DL (ref 2–4)
GLUCOSE BLD STRIP.AUTO-MCNC: 76 MG/DL (ref 65–100)
GLUCOSE SERPL-MCNC: 122 MG/DL (ref 65–100)
GLUCOSE SERPL-MCNC: 68 MG/DL (ref 65–100)
HCT VFR BLD AUTO: 31 % (ref 36.6–50.3)
HGB BLD-MCNC: 10.5 G/DL (ref 12.1–17)
LACTATE SERPL-SCNC: 1.8 MMOL/L (ref 0.4–2)
MCH RBC QN AUTO: 30.4 PG (ref 26–34)
MCHC RBC AUTO-ENTMCNC: 33.9 G/DL (ref 30–36.5)
MCV RBC AUTO: 89.9 FL (ref 80–99)
NRBC # BLD: 0 K/UL (ref 0–0.01)
NRBC BLD-RTO: 0 PER 100 WBC
PLATELET # BLD AUTO: 109 K/UL (ref 150–400)
PMV BLD AUTO: 11.4 FL (ref 8.9–12.9)
POTASSIUM SERPL-SCNC: 5.3 MMOL/L (ref 3.5–5.1)
POTASSIUM SERPL-SCNC: 5.3 MMOL/L (ref 3.5–5.1)
PROT SERPL-MCNC: 7 G/DL (ref 6.4–8.2)
RBC # BLD AUTO: 3.45 M/UL (ref 4.1–5.7)
SERVICE CMNT-IMP: NORMAL
SODIUM SERPL-SCNC: 134 MMOL/L (ref 136–145)
SODIUM SERPL-SCNC: 140 MMOL/L (ref 136–145)
WBC # BLD AUTO: 2 K/UL (ref 4.1–11.1)

## 2018-12-28 PROCEDURE — 74011250636 HC RX REV CODE- 250/636: Performed by: INTERNAL MEDICINE

## 2018-12-28 PROCEDURE — 82550 ASSAY OF CK (CPK): CPT

## 2018-12-28 PROCEDURE — 80053 COMPREHEN METABOLIC PANEL: CPT

## 2018-12-28 PROCEDURE — 97116 GAIT TRAINING THERAPY: CPT | Performed by: PHYSICAL THERAPIST

## 2018-12-28 PROCEDURE — 36415 COLL VENOUS BLD VENIPUNCTURE: CPT

## 2018-12-28 PROCEDURE — G8979 MOBILITY GOAL STATUS: HCPCS | Performed by: PHYSICAL THERAPIST

## 2018-12-28 PROCEDURE — 97161 PT EVAL LOW COMPLEX 20 MIN: CPT | Performed by: PHYSICAL THERAPIST

## 2018-12-28 PROCEDURE — 83605 ASSAY OF LACTIC ACID: CPT

## 2018-12-28 PROCEDURE — 82962 GLUCOSE BLOOD TEST: CPT

## 2018-12-28 PROCEDURE — G8978 MOBILITY CURRENT STATUS: HCPCS | Performed by: PHYSICAL THERAPIST

## 2018-12-28 PROCEDURE — 85027 COMPLETE CBC AUTOMATED: CPT

## 2018-12-28 PROCEDURE — 65660000001 HC RM ICU INTERMED STEPDOWN

## 2018-12-28 PROCEDURE — 74011250637 HC RX REV CODE- 250/637: Performed by: INTERNAL MEDICINE

## 2018-12-28 RX ADMIN — SODIUM CHLORIDE 100 ML/HR: 900 INJECTION, SOLUTION INTRAVENOUS at 07:41

## 2018-12-28 RX ADMIN — GABAPENTIN 300 MG: 300 CAPSULE ORAL at 17:32

## 2018-12-28 RX ADMIN — Medication 10 ML: at 21:44

## 2018-12-28 RX ADMIN — THERA TABS 1 TABLET: TAB at 09:00

## 2018-12-28 RX ADMIN — Medication 5 ML: at 14:00

## 2018-12-28 RX ADMIN — LORAZEPAM 2 MG: 2 INJECTION, SOLUTION INTRAMUSCULAR; INTRAVENOUS at 06:34

## 2018-12-28 RX ADMIN — Medication 100 MG: at 09:26

## 2018-12-28 RX ADMIN — LORAZEPAM 2 MG: 2 INJECTION, SOLUTION INTRAMUSCULAR; INTRAVENOUS at 05:15

## 2018-12-28 RX ADMIN — LORAZEPAM 2 MG: 2 INJECTION, SOLUTION INTRAMUSCULAR; INTRAVENOUS at 01:05

## 2018-12-28 RX ADMIN — ENOXAPARIN SODIUM 40 MG: 40 INJECTION, SOLUTION INTRAVENOUS; SUBCUTANEOUS at 21:41

## 2018-12-28 RX ADMIN — GABAPENTIN 300 MG: 300 CAPSULE ORAL at 09:26

## 2018-12-28 RX ADMIN — GABAPENTIN 300 MG: 300 CAPSULE ORAL at 21:41

## 2018-12-28 RX ADMIN — LORAZEPAM 2 MG: 2 INJECTION, SOLUTION INTRAMUSCULAR; INTRAVENOUS at 03:20

## 2018-12-28 RX ADMIN — FOLIC ACID 1 MG: 1 TABLET ORAL at 09:26

## 2018-12-28 RX ADMIN — Medication 10 ML: at 05:15

## 2018-12-28 NOTE — PROGRESS NOTES
2025 Pt able to tell me his name and time. Unable to tell me where he was or why he was here. He was sweating and lethargic. 2330 Pt alert and orientated. Able to follow all commands. Up watching TV.  0240 Called and spoke to Doctor Kristina Brock about pt labs. Ordered labs for morning.

## 2018-12-28 NOTE — PROGRESS NOTES
Problem: Mobility Impaired (Adult and Pediatric) Goal: *Acute Goals and Plan of Care (Insert Text) Physical Therapy Goals Initiated 12/28/2018 1. Patient will move from supine to sit and sit to supine  in bed with minimal assistance/contact guard assist within 7 day(s). 2.  Patient will transfer from bed to chair and chair to bed with minimal assistance using the least restrictive device within 7 day(s). 3.  Patient will perform sit to stand with minimal assistancewithin 7 day(s). 4.  Patient will ambulate with minimal assistancefor 50 feet with the least restrictive device within 7 day(s). physical Therapy EVALUATION Patient: Giana Coello (68 y.o. male) Date: 12/28/2018 Primary Diagnosis: Alcohol intoxication delirium with moderate or severe use disorder ([facility] [address] Precautions:    
 
ASSESSMENT : 
Based on the objective data described below, the patient presents with decreased balance and overall functional mobility secondary to ETOH intoxication delirium per chart. Patient with frequent falls at home (1-2 times per week) which are related to his alcohol consumption per patient. Patient with tremors throughout upon start of session. Patient performed sit to stand transfer with minimal assistance. Patient with fair static sitting balance; posterior lean noted. Patient performed sit to stand transfer with moderate to maximal assistance secondary to posterior lean. Patient ambulated 20 feet with moderate to maximal assistance of one and increased time. Patient required manual assistance at hips for weight shifting. Patient with wide base of support, slow pace, increased trunk sway, and decreased stride length. Patient may benefit from inpatient rehab stay pending progress with therapy as patient was previously independent per chart. Patient will benefit from skilled intervention to address the above impairments. Patients rehabilitation potential is considered to be Fair Factors which may influence rehabilitation potential include:  
[]         None noted 
[]         Mental ability/status [x]         Medical condition 
[]         Home/family situation and support systems 
[]         Safety awareness 
[]         Pain tolerance/management 
[]         Other: PLAN : 
Recommendations and Planned Interventions: 
[x]           Bed Mobility Training             [x]    Neuromuscular Re-Education 
[x]           Transfer Training                   []    Orthotic/Prosthetic Training 
[x]           Gait Training                         []    Modalities [x]           Therapeutic Exercises           []    Edema Management/Control 
[x]           Therapeutic Activities            [x]    Patient and Family Training/Education 
[]           Other (comment): Frequency/Duration: Patient will be followed by physical therapy  3 times a week to address goals. Discharge Recommendations: Inpatient rehab pending progress Further Equipment Recommendations for Discharge: TBD; possibly a straight cane pending progress SUBJECTIVE:  
Patient stated I'm okay.  OBJECTIVE DATA SUMMARY:  
HISTORY:   
Past Medical History:  
Diagnosis Date  Alcohol abuse  Asthma  Hypertension History reviewed. No pertinent surgical history. Prior Level of Function/Home Situation: Patient lives with his niece. Patient reports frequent falls at home (roughly 1-2 times per week); patient reports that his falls occur when he has been drinking. Patient reports that he has used a cane and walker before, but unable to state if he still has them. Personal factors and/or comorbidities impacting plan of care: ETOH dependence Home Situation Home Environment: Apartment # Steps to Enter: 0 One/Two Story Residence: Two story # of Interior Steps: 13 Living Alone: No 
Support Systems: Family member(s) Patient Expects to be Discharged to[de-identified] [facility] Current DME Used/Available at Home: None EXAMINATION/PRESENTATION/DECISION MAKING: Critical Behavior: 
Neurologic State: Alert, Drowsy Orientation Level: Oriented X4 Cognition: Follows commands Hearing: Auditory Auditory Impairment: Hearing aid(s), None Hearing Aids/Status: Other (comment)(none) Range Of Motion: 
AROM: Generally decreased, functional 
PROM: Generally decreased, functional 
  
Strength:   
Strength: Generally decreased, functional 
  
Functional Mobility: 
Bed Mobility: 
Supine to Sit: Minimum assistance;Assist x1;Additional time Scooting: Minimum assistance;Assist x1;Additional time Transfers: 
Sit to Stand: Moderate assistance;Maximum assistance;Assist x1;Additional time Stand to Sit: Moderate assistance;Maximum assistance;Assist x1;Additional time Balance:  
Sitting: Impaired Sitting - Static: Fair (occasional) Sitting - Dynamic: Poor (constant support) Standing: Impaired Standing - Static: Poor Standing - Dynamic : Poor Ambulation/Gait Training:Distance (ft): 20 Feet (ft) Assistive Device: Gait belt Ambulation - Level of Assistance: Moderate assistance;Assist x1;Additional time Gait Description (WDL): Exceptions to [facility] Gait Abnormalities: Decreased step clearance;Trunk sway increased Base of Support: Widened Speed/Jennifer: Shuffled; Slow Step Length: Left shortened;Right shortened Therapeutic Exercises:  
Instructed to perform ankle pumps and LAQ in chair as able Functional Measure: 
Functional Reach: 
 
Completed: 
[] standing       [x] seated Average: 5 Functional Reach Test and G-code impairment scale: For inches: Measure in cm and divide by 2.54 Percentage of Impairment CH 
 
0% 
 CI 
 
1-19% CJ 
 
20-39% CK 
 
40-59% CL 
 
60-79% CM 
 
80-99% CN  
 
100% Functional Reach Score 15-25 cm 25 24 22-23 20-21 18-19 16-17 < 15 Functional Reach Score 6-10 in 10      < 6 Functional reach: (standing) Reaches £  6 in = High Fall Risk Reaches 6-10 in = Moderate Fall Risk Reaches ³  10 in = Low Fall Risk Waleska Turner et al. Functional reach: a new clinical measure of balance. [facility] Rossiderek; 39: [id number]. Modified Functional Reach: (seated) Age: Men: Women:  
18-38 17.9\" 17.6\" 40-59 17.5\" 15.9\" 60-79 14.4\" 13.2\" 80-97 14.0\" 12.5\" Aleah Browne Forward and Lateral Sitting Functional Reach in Younger, Middle-aged, and Older Adults. J Geriatric Phys Ther. 2007; 30:43-48 G codes: In compliance with CMSs Claims Based Outcome Reporting, the following G-code set was chosen for this patient based on their primary functional limitation being treated: The outcome measure chosen to determine the severity of the functional limitation was the Functional Reach with a score of 5/>10 which was correlated with the impairment scale. ? Mobility - Walking and Moving Around:  
  - CURRENT STATUS: CL - 60%-79% impaired, limited or restricted  - GOAL STATUS:  CK - 40%-59% impaired, limited or restricted  - D/C STATUS: ---------------To be determined--------------- Based on the above components, the patient evaluation is determined to be of the following complexity level: LOW Pain: 
Pain Scale 1: Numeric (0 - 10) Pain Intensity 1: 0 Activity Tolerance:  
Fair Please refer to the flowsheet for vital signs taken during this treatment. After treatment:  
[x]         Patient left in no apparent distress sitting up in chair 
[]         Patient left in no apparent distress in bed 
[x]         Call bell left within reach [x]         Nursing notified 
[x]         Nurse and PCT present 
[]         Bed alarm activated COMMUNICATION/EDUCATION:  
The patients plan of care was discussed with: Registered Nurse. [x]         Fall prevention education was provided and the patient/caregiver indicated understanding. [x]         Patient/family have participated as able in goal setting and plan of care. [x]         Patient/family agree to work toward stated goals and plan of care. []         Patient understands intent and goals of therapy, but is neutral about his/her participation. []         Patient is unable to participate in goal setting and plan of care. Thank you for this referral. 
Sergio Brito, PT Time Calculation: 20 mins

## 2018-12-28 NOTE — PROGRESS NOTES
[facility]: MED RECONCILIATION Information obtained from: Patient and niece, Cortney Ag Allergies: Aspirin Prior to Admission Medications: None Thank you, Kennedy Daugherty, PharmD, BCPS 
[phone]

## 2018-12-28 NOTE — PROGRESS NOTES
Hospitalist Progress Note Subjective:  
Daily Progress Note: 2018 10:50 AM 
 
Slept well last evening. With tremor today. No hallucinations. Current Facility-Administered Medications Medication Dose Route Frequency  LORazepam (ATIVAN) injection 2 mg  2 mg IntraVENous Q1H PRN  
 LORazepam (ATIVAN) injection 4 mg  4 mg IntraVENous Q1H PRN  
 sodium chloride (NS) flush 5-10 mL  5-10 mL IntraVENous Q8H  
 sodium chloride (NS) flush 5-10 mL  5-10 mL IntraVENous PRN  
 enoxaparin (LOVENOX) injection 40 mg  40 mg SubCUTAneous Q24H  
 0.9% sodium chloride infusion  100 mL/hr IntraVENous CONTINUOUS  
 gabapentin (NEURONTIN) capsule 300 mg  300 mg Oral TID  thiamine HCL (B-1) tablet 100 mg  100 mg Oral DAILY  therapeutic multivitamin (THERAGRAN) tablet 1 Tab  1 Tab Oral DAILY  folic acid (FOLVITE) tablet 1 mg  1 mg Oral DAILY Review of Systems A comprehensive review of systems was negative except for that written in the HPI. Objective:  
 
Visit Vitals BP (!) 163/133 Pulse (!) 117 Temp 98.3 °F (36.8 °C) Resp 19 Ht 5' 9\" (1.753 m) Wt 78 kg (172 lb) SpO2 97% BMI 25.40 kg/m² O2 Flow Rate (L/min): 2 l/min O2 Device: Nasal cannula Temp (24hrs), Av °F (36.7 °C), Min:97.6 °F (36.4 °C), Max:98.3 °F (36.8 °C) No intake/output data recorded.  1901 -  0700 In: 656.7 [I.V.:656.7] Out: 500 [Urine:500] Visit Vitals BP (!) 163/133 Pulse (!) 117 Temp 98.3 °F (36.8 °C) Resp 19 Ht 5' 9\" (1.753 m) Wt 78 kg (172 lb) SpO2 97% BMI 25.40 kg/m² General:  Alert, cooperative, no distress, appears stated age. Head:  Normocephalic, without obvious abnormality, atraumatic. Eyes:  Conjunctivae/corneas clear. PERRL, EOMs intact. Fundi benign Ears:  Normal TMs and external ear canals both ears. Nose: Nares normal. Septum midline. Mucosa normal. No drainage or sinus tenderness. Throat: Lips, mucosa, and tongue normal. Teeth and gums normal.  
Neck: Supple, symmetrical, trachea midline, no adenopathy, thyroid: no enlargement/tenderness/nodules, no carotid bruit and no JVD. Back:   Symmetric, no curvature. ROM normal. No CVA tenderness. Lungs:   Clear to auscultation bilaterally. Chest wall:  No tenderness or deformity. Heart:  Regular rate and rhythm, S1, S2 normal, no murmur, click, rub or gallop. Abdomen:   Soft, non-tender. Bowel sounds normal. No masses,  No organomegaly. Genitalia:  Normal male without lesion, discharge or tenderness. Rectal:  Normal tone, normal prostate, no masses or tenderness Guaiac negative stool. Extremities: Extremities normal, atraumatic, no cyanosis or edema. Pulses: 2+ and symmetric all extremities. Skin: Skin color, texture, turgor normal. No rashes or lesions Lymph nodes: Cervical, supraclavicular, and axillary nodes normal.  
Neurologic: CNII-XII intact. Normal strength, sensation and reflexes throughout. Additional comments:I reviewed the patient's new clinical lab test results. lytes stable. Data Review Recent Results (from the past 24 hour(s)) LACTIC ACID Collection Time: 12/27/18  3:26 PM  
Result Value Ref Range Lactic acid 3.2 (HH) 0.4 - 2.0 MMOL/L  
LACTIC ACID Collection Time: 12/27/18  8:26 PM  
Result Value Ref Range Lactic acid 2.6 (HH) 0.4 - 2.0 MMOL/L  
METABOLIC PANEL, BASIC Collection Time: 12/28/18  1:23 AM  
Result Value Ref Range Sodium 140 136 - 145 mmol/L Potassium 5.3 (H) 3.5 - 5.1 mmol/L Chloride 103 97 - 108 mmol/L  
 CO2 23 21 - 32 mmol/L Anion gap 14 5 - 15 mmol/L Glucose 68 65 - 100 mg/dL BUN 13 6 - 20 MG/DL Creatinine 0.76 0.70 - 1.30 MG/DL  
 BUN/Creatinine ratio 17 12 - 20 GFR est AA >60 >60 ml/min/1.73m2 GFR est non-AA >60 >60 ml/min/1.73m2 Calcium 8.1 (L) 8.5 - 10.1 MG/DL  
CBC W/O DIFF  Collection Time: 12/28/18  1:23 AM  
 Result Value Ref Range WBC 2.0 (L) 4.1 - 11.1 K/uL  
 RBC 3.45 (L) 4.10 - 5.70 M/uL  
 HGB 10.5 (L) 12.1 - 17.0 g/dL HCT 31.0 (L) 36.6 - 50.3 % MCV 89.9 80.0 - 99.0 FL  
 MCH 30.4 26.0 - 34.0 PG  
 MCHC 33.9 30.0 - 36.5 g/dL  
 RDW 15.5 (H) 11.5 - 14.5 % PLATELET 453 (L) 820 - 400 K/uL MPV 11.4 8.9 - 12.9 FL  
 NRBC 0.0 0  WBC ABSOLUTE NRBC 0.00 0.00 - 0.01 K/uL CK Collection Time: 12/28/18  1:23 AM  
Result Value Ref Range  (H) 39 - 308 U/L  
LACTIC ACID Collection Time: 12/28/18  1:23 AM  
Result Value Ref Range Lactic acid 1.8 0.4 - 2.0 MMOL/L  
GLUCOSE, POC Collection Time: 12/28/18  2:27 AM  
Result Value Ref Range Glucose (POC) 76 65 - 100 mg/dL Performed by Zak Lucas Assessment/Plan:  
 
Principal Problem: 
  Alcohol intoxication delirium with moderate or severe [facility] (12/27/2018) Active Problems: 
  Syncope (12/27/2018) Stage 3 chronic kidney disease ([facility] [address] (12/27/2018) Alcoholic hepatitis (46/02/9397) Non-traumatic rhabdomyolysis (12/27/2018) Alcohol intoxication delirium with moderate or severe use disorder:POA Alcohol hepatitis CIWA protocol, MVI Gabapentin for augmentation Risk for alcohol withdrawal seizures PRN Ativan Phenobarb if ciwa > 6. Syncopal episode Multiple falls per family Mild left nasolabial droop, unclear whether this is patient's baseline or something new Head CT- unremarkable Monitor on telemetry,check orthostatics Neuro consulted. Defer MRI/MRA to neurology 
  
Lactic acidosis/starvation ketosis LA 4.3,c/w ivf Recheck LA Nontraumatic rhabdomyolysis Monitor CK, aggressive fluid hydration. Monitor potassium. Check at noon. CKD, stage III Avoid nephrotoxins Marijuana abuse UDS positive for THC  
  
Body mass index is 25.4 kg/m². Code Status: full Surrogate Decision Maker:niece 
  
DVT Prophylaxis: Lovenox GI Prophylaxis: not indicated 
  
 
 
 Care Plan discussed with: Patient/Family Total time spent with patient/care team: 30 minutes. Signed By: Genaro Montes DO   
 December 28, 2018

## 2018-12-29 LAB
ANION GAP SERPL CALC-SCNC: 8 MMOL/L (ref 5–15)
ATRIAL RATE: 93 BPM
BACTERIA SPEC CULT: NORMAL
BACTERIA SPEC CULT: NORMAL
BUN SERPL-MCNC: 12 MG/DL (ref 6–20)
BUN/CREAT SERPL: 14 (ref 12–20)
CALCIUM SERPL-MCNC: 8.4 MG/DL (ref 8.5–10.1)
CALCULATED P AXIS, ECG09: 40 DEGREES
CALCULATED R AXIS, ECG10: 40 DEGREES
CALCULATED T AXIS, ECG11: 52 DEGREES
CHLORIDE SERPL-SCNC: 99 MMOL/L (ref 97–108)
CO2 SERPL-SCNC: 27 MMOL/L (ref 21–32)
CREAT SERPL-MCNC: 0.88 MG/DL (ref 0.7–1.3)
DIAGNOSIS, 93000: NORMAL
GLUCOSE SERPL-MCNC: 113 MG/DL (ref 65–100)
P-R INTERVAL, ECG05: 180 MS
POTASSIUM SERPL-SCNC: 3.6 MMOL/L (ref 3.5–5.1)
Q-T INTERVAL, ECG07: 376 MS
QRS DURATION, ECG06: 80 MS
QTC CALCULATION (BEZET), ECG08: 467 MS
SERVICE CMNT-IMP: NORMAL
SODIUM SERPL-SCNC: 134 MMOL/L (ref 136–145)
VENTRICULAR RATE, ECG03: 93 BPM

## 2018-12-29 PROCEDURE — C1758 CATHETER, URETERAL: HCPCS

## 2018-12-29 PROCEDURE — 74011250636 HC RX REV CODE- 250/636: Performed by: NEUROMUSCULOSKELETAL MEDICINE & OMM

## 2018-12-29 PROCEDURE — 36415 COLL VENOUS BLD VENIPUNCTURE: CPT

## 2018-12-29 PROCEDURE — 65660000001 HC RM ICU INTERMED STEPDOWN

## 2018-12-29 PROCEDURE — 74011250637 HC RX REV CODE- 250/637: Performed by: NEUROMUSCULOSKELETAL MEDICINE & OMM

## 2018-12-29 PROCEDURE — 80048 BASIC METABOLIC PNL TOTAL CA: CPT

## 2018-12-29 PROCEDURE — 74011250637 HC RX REV CODE- 250/637: Performed by: INTERNAL MEDICINE

## 2018-12-29 PROCEDURE — 77030010547 HC BG URIN W/UMETER -A

## 2018-12-29 PROCEDURE — 74011250636 HC RX REV CODE- 250/636: Performed by: INTERNAL MEDICINE

## 2018-12-29 RX ORDER — LABETALOL HCL 20 MG/4 ML
20 SYRINGE (ML) INTRAVENOUS ONCE
Status: COMPLETED | OUTPATIENT
Start: 2018-12-29 | End: 2018-12-29

## 2018-12-29 RX ORDER — ACETAMINOPHEN 500 MG
500 TABLET ORAL
Status: DISCONTINUED | OUTPATIENT
Start: 2018-12-29 | End: 2019-01-04 | Stop reason: HOSPADM

## 2018-12-29 RX ORDER — LORAZEPAM 2 MG/ML
4 INJECTION INTRAMUSCULAR ONCE
Status: DISPENSED | OUTPATIENT
Start: 2018-12-29 | End: 2018-12-29

## 2018-12-29 RX ORDER — PHENOBARBITAL 32.4 MG/1
32.4 TABLET ORAL 3 TIMES DAILY
Status: DISCONTINUED | OUTPATIENT
Start: 2018-12-29 | End: 2018-12-30

## 2018-12-29 RX ADMIN — FOLIC ACID 1 MG: 1 TABLET ORAL at 08:19

## 2018-12-29 RX ADMIN — Medication 10 ML: at 05:48

## 2018-12-29 RX ADMIN — GABAPENTIN 300 MG: 300 CAPSULE ORAL at 08:19

## 2018-12-29 RX ADMIN — PHENOBARBITAL 32.4 MG: 32.4 TABLET ORAL at 11:13

## 2018-12-29 RX ADMIN — LORAZEPAM 4 MG: 2 INJECTION, SOLUTION INTRAMUSCULAR; INTRAVENOUS at 00:46

## 2018-12-29 RX ADMIN — Medication 100 MG: at 08:19

## 2018-12-29 RX ADMIN — PHENOBARBITAL 32.4 MG: 32.4 TABLET ORAL at 21:30

## 2018-12-29 RX ADMIN — SODIUM CHLORIDE 100 ML/HR: 900 INJECTION, SOLUTION INTRAVENOUS at 00:53

## 2018-12-29 RX ADMIN — Medication 10 ML: at 12:35

## 2018-12-29 RX ADMIN — PHENOBARBITAL 32.4 MG: 32.4 TABLET ORAL at 16:32

## 2018-12-29 RX ADMIN — LABETALOL HYDROCHLORIDE 20 MG: 5 INJECTION, SOLUTION INTRAVENOUS at 12:29

## 2018-12-29 RX ADMIN — Medication 10 ML: at 21:30

## 2018-12-29 RX ADMIN — GABAPENTIN 300 MG: 300 CAPSULE ORAL at 16:32

## 2018-12-29 RX ADMIN — LORAZEPAM 4 MG: 2 INJECTION, SOLUTION INTRAMUSCULAR; INTRAVENOUS at 10:00

## 2018-12-29 RX ADMIN — THERA TABS 1 TABLET: TAB at 08:19

## 2018-12-29 RX ADMIN — ACETAMINOPHEN 500 MG: 500 TABLET, FILM COATED ORAL at 13:34

## 2018-12-29 RX ADMIN — GABAPENTIN 300 MG: 300 CAPSULE ORAL at 21:30

## 2018-12-29 RX ADMIN — ENOXAPARIN SODIUM 40 MG: 40 INJECTION, SOLUTION INTRAVENOUS; SUBCUTANEOUS at 21:30

## 2018-12-29 NOTE — PROGRESS NOTES
Bedside and Verbal shift change report given to Bibi Brandon (oncoming nurse) and [facility] by Carlee Mock (offgoing nurse). Report included the following information SBAR, Kardex, MAR, Accordion and Cardiac Rhythm nsr to sinus tachycardia with activity. [facility].

## 2018-12-29 NOTE — PROGRESS NOTES
5320 Patient CIWA at 12 
1000 4 mg IV Ativan given 1035 Patient CIWA reassessed, remains at 15. Notified Dr. Norberto Bartlett. TWRB order to give additional dose of 4 mg IV Ativan now. 11:02 AM 
MD placed orders for PO Phenobarbital. TWRB orders to give PO phenobarbital now, reassess CIWA in 1 hour, and proceed with IV Ativan if needed. 12:11 PM 
Patient MEWS 4. Notified Dr. Norberto Bartlett who gave TWRB orders for 20 mg IV labetalol once. Continue to monitor. 12:56 PM 
Notified Dr. Norberto Bartlett of updated VS post IVP labetalol. Temp 100.9. Orders received for PO Tylenol 500 mg Q6 PRN.

## 2018-12-29 NOTE — PROGRESS NOTES
Bedside verbal report received from previous RN, Kelly Durant. Pt resting in bed, watching TV. Call bell remains next to pt.

## 2018-12-29 NOTE — PROGRESS NOTES
Problem: Alcohol Withdrawal 
Goal: *STG: Seeks staff when symptoms of withdrawal increase Outcome: Not Progressing Towards Goal 
Variance: Patient slowly responding Comments: Patient with memory loss, impulsiveness, periods of confusion. Problem: Falls - Risk of 
Goal: *Absence of Falls Document Janine Fearing Fall Risk and appropriate interventions in the flowsheet. Outcome: Progressing Towards Goal 
Fall Risk Interventions: 
Mobility Interventions: PT Consult for mobility concerns, Patient to call before getting OOB Medication Interventions: Patient to call before getting OOB Elimination Interventions: Call light in reach, Urinal in reach, Toileting schedule/hourly rounds History of Falls Interventions: Door open when patient unattended, Room close to nurse's station Problem: Pressure Injury - Risk of 
Goal: *Prevention of pressure injury Document Willard Scale and appropriate interventions in the flowsheet. Outcome: Progressing Towards Goal 
Pressure Injury Interventions: 
  
 
Moisture Interventions: Absorbent underpads, Check for incontinence Q2 hours and as needed, Offer toileting Q_hr Activity Interventions: Increase time out of bed, PT/OT evaluation Mobility Interventions: HOB 30 degrees or less, PT/OT evaluation, Pressure redistribution bed/mattress (bed type) Nutrition Interventions: Document food/fluid/supplement intake

## 2018-12-29 NOTE — PROGRESS NOTES
Problem: Falls - Risk of 
Goal: *Absence of Falls Document Ross Hays Fall Risk and appropriate interventions in the flowsheet. Outcome: Progressing Towards Goal 
Fall Risk Interventions: 
Mobility Interventions: Patient to call before getting OOB, PT Consult for mobility concerns Medication Interventions: Evaluate medications/consider consulting pharmacy, Patient to call before getting OOB Elimination Interventions: Call light in reach History of Falls Interventions: Door open when patient unattended, Evaluate medications/consider consulting pharmacy, Investigate reason for fall

## 2018-12-29 NOTE — PROGRESS NOTES
Hospitalist Progress Note Subjective:  
Daily Progress Note: 2018 10:46 AM 
 
Remains tremulous with ciwa reported as 12. The pt is awake and coherent and slept most of the night. Current Facility-Administered Medications Medication Dose Route Frequency  PHENobarbital (LUMINAL) tablet 32.4 mg  32.4 mg Oral TID  LORazepam (ATIVAN) injection 2 mg  2 mg IntraVENous Q1H PRN  
 LORazepam (ATIVAN) injection 4 mg  4 mg IntraVENous Q1H PRN  
 sodium chloride (NS) flush 5-10 mL  5-10 mL IntraVENous Q8H  
 sodium chloride (NS) flush 5-10 mL  5-10 mL IntraVENous PRN  
 enoxaparin (LOVENOX) injection 40 mg  40 mg SubCUTAneous Q24H  
 0.9% sodium chloride infusion  100 mL/hr IntraVENous CONTINUOUS  
 gabapentin (NEURONTIN) capsule 300 mg  300 mg Oral TID  thiamine HCL (B-1) tablet 100 mg  100 mg Oral DAILY  therapeutic multivitamin (THERAGRAN) tablet 1 Tab  1 Tab Oral DAILY  folic acid (FOLVITE) tablet 1 mg  1 mg Oral DAILY Review of Systems A comprehensive review of systems was negative except for that written in the HPI. Objective:  
 
Visit Vitals BP (!) 140/103 Pulse (!) 135 Temp 99.6 °F (37.6 °C) Resp 26 Ht 5' 9\" (1.753 m) Wt 78 kg (172 lb) SpO2 95% BMI 25.40 kg/m² O2 Flow Rate (L/min): 2 l/min O2 Device: Room air Temp (24hrs), Av.9 °F (37.2 °C), Min:97.8 °F (36.6 °C), Max:100.1 °F (37.8 °C) 
 
 
701 - 1900 In: -  
Out: 940 [YZCRI:333] 1901 -  07 In: 3833.3 [P.O.:480; I.V.:3353.3] Out: 1550 [EUQRT:0414] Visit Vitals BP (!) 140/103 Pulse (!) 135 Temp 99.6 °F (37.6 °C) Resp 26 Ht 5' 9\" (1.753 m) Wt 78 kg (172 lb) SpO2 95% BMI 25.40 kg/m² General:  Alert, cooperative, no distress, appears stated age. Head:  Normocephalic, without obvious abnormality, atraumatic. Eyes:  Conjunctivae/corneas clear. PERRL, EOMs intact. Fundi benign Ears:  Normal TMs and external ear canals both ears. Nose: Nares normal. Septum midline. Mucosa normal. No drainage or sinus tenderness. Throat: Lips, mucosa, and tongue normal. Teeth and gums normal.  
Neck: Supple, symmetrical, trachea midline, no adenopathy, thyroid: no enlargement/tenderness/nodules, no carotid bruit and no JVD. Back:   Symmetric, no curvature. ROM normal. No CVA tenderness. Lungs:   Clear to auscultation bilaterally. Chest wall:  No tenderness or deformity. Heart:  Regular rate and rhythm, S1, S2 normal, no murmur, click, rub or gallop. Abdomen:   Soft, non-tender. Bowel sounds normal. No masses,  No organomegaly. Genitalia:  Normal male without lesion, discharge or tenderness. Rectal:  Normal tone, normal prostate, no masses or tenderness Guaiac negative stool. Extremities: Extremities normal, atraumatic, no cyanosis or edema. Pulses: 2+ and symmetric all extremities. Skin: Skin color, texture, turgor normal. No rashes or lesions Lymph nodes: Cervical, supraclavicular, and axillary nodes normal.  
Neurologic: CNII-XII intact. Normal strength, sensation and reflexes throughout. Additional comments:I reviewed the patient's new clinical lab test results. potasium improved Data Review Recent Results (from the past 24 hour(s)) METABOLIC PANEL, COMPREHENSIVE Collection Time: 12/28/18  8:09 PM  
Result Value Ref Range Sodium 134 (L) 136 - 145 mmol/L Potassium 5.3 (H) 3.5 - 5.1 mmol/L Chloride 98 97 - 108 mmol/L  
 CO2 27 21 - 32 mmol/L Anion gap 9 5 - 15 mmol/L Glucose 122 (H) 65 - 100 mg/dL BUN 15 6 - 20 MG/DL Creatinine 1.14 0.70 - 1.30 MG/DL  
 BUN/Creatinine ratio 13 12 - 20 GFR est AA >60 >60 ml/min/1.73m2 GFR est non-AA >60 >60 ml/min/1.73m2 Calcium 8.3 (L) 8.5 - 10.1 MG/DL Bilirubin, total 0.7 0.2 - 1.0 MG/DL  
 ALT (SGPT) 38 12 - 78 U/L  
 AST (SGOT) 112 (H) 15 - 37 U/L Alk. phosphatase 89 45 - 117 U/L Protein, total 7.0 6.4 - 8.2 g/dL Albumin 2.7 (L) 3.5 - 5.0 g/dL Globulin 4.3 (H) 2.0 - 4.0 g/dL A-G Ratio 0.6 (L) 1.1 - 2.2 CK Collection Time: 12/28/18  8:09 PM  
Result Value Ref Range  (H) 39 - 690 U/L  
METABOLIC PANEL, BASIC Collection Time: 12/29/18  4:23 AM  
Result Value Ref Range Sodium 134 (L) 136 - 145 mmol/L Potassium 3.6 3.5 - 5.1 mmol/L Chloride 99 97 - 108 mmol/L  
 CO2 27 21 - 32 mmol/L Anion gap 8 5 - 15 mmol/L Glucose 113 (H) 65 - 100 mg/dL BUN 12 6 - 20 MG/DL Creatinine 0.88 0.70 - 1.30 MG/DL  
 BUN/Creatinine ratio 14 12 - 20 GFR est AA >60 >60 ml/min/1.73m2 GFR est non-AA >60 >60 ml/min/1.73m2 Calcium 8.4 (L) 8.5 - 10.1 MG/DL Assessment/Plan:  
 
Principal Problem: 
  Alcohol intoxication delirium with moderate or severe [facility] (12/27/2018) Active Problems: 
  Syncope (12/27/2018) Stage 3 chronic kidney disease ([facility] [address] (12/27/2018) Alcoholic hepatitis (68/72/1090) Non-traumatic rhabdomyolysis (12/27/2018) Alcohol intoxication delirium with moderate or severe use disorder:POA Alcohol hepatitis CIWA protocol, MVI Gabapentin for augmentation Risk for alcohol withdrawal seizures PRN Ativan Add phenobarb for ciwa of 12. Syncopal episode Multiple falls per family Mild left nasolabial droop, unclear whether this is patient's baseline or something new Head CT- unremarkable Monitor on telemetry,check orthostatics Neuro consulted. Defer MRI/MRA to neurology 
  
Lactic acidosis/starvation ketosis LA 4.3,c/w ivf Recheck LA Nontraumatic rhabdomyolysis Monitor CK, aggressive fluid hydration. Monitor potassium. Check at noon. CKD, stage III Avoid nephrotoxins Marijuana abuse UDS positive for [facility] [place] 
  
Body mass index is 25.4 kg/m². Code Status: full Surrogate Decision Maker:niece 
  
DVT Prophylaxis: Lovenox GI Prophylaxis: not indicated 
  
 
 
 
 
Care Plan discussed with: Patient/Family Total time spent with patient/care team: 30 minutes. Signed By: Gary Silva DO   
 December 29, 2018

## 2018-12-30 ENCOUNTER — APPOINTMENT (OUTPATIENT)
Dept: CT IMAGING | Age: 53
DRG: 897 | End: 2018-12-30
Attending: NEUROMUSCULOSKELETAL MEDICINE & OMM
Payer: MEDICARE

## 2018-12-30 LAB
ANION GAP SERPL CALC-SCNC: 10 MMOL/L (ref 5–15)
BASOPHILS # BLD: 0 K/UL (ref 0–0.1)
BASOPHILS NFR BLD: 0 % (ref 0–1)
BLASTS NFR BLD MANUAL: 0 %
BUN SERPL-MCNC: 11 MG/DL (ref 6–20)
BUN/CREAT SERPL: 13 (ref 12–20)
CALCIUM SERPL-MCNC: 9.2 MG/DL (ref 8.5–10.1)
CHLORIDE SERPL-SCNC: 99 MMOL/L (ref 97–108)
CO2 SERPL-SCNC: 27 MMOL/L (ref 21–32)
CREAT SERPL-MCNC: 0.83 MG/DL (ref 0.7–1.3)
DIFFERENTIAL METHOD BLD: ABNORMAL
EOSINOPHIL # BLD: 0.1 K/UL (ref 0–0.4)
EOSINOPHIL NFR BLD: 2 % (ref 0–7)
ERYTHROCYTE [DISTWIDTH] IN BLOOD BY AUTOMATED COUNT: 15.2 % (ref 11.5–14.5)
ERYTHROCYTE [DISTWIDTH] IN BLOOD BY AUTOMATED COUNT: 15.3 % (ref 11.5–14.5)
GLUCOSE SERPL-MCNC: 108 MG/DL (ref 65–100)
HCT VFR BLD AUTO: 30.3 % (ref 36.6–50.3)
HCT VFR BLD AUTO: 33.3 % (ref 36.6–50.3)
HGB BLD-MCNC: 10.2 G/DL (ref 12.1–17)
HGB BLD-MCNC: 11 G/DL (ref 12.1–17)
IMM GRANULOCYTES # BLD: 0 K/UL
IMM GRANULOCYTES NFR BLD AUTO: 0 %
LYMPHOCYTES # BLD: 1.1 K/UL (ref 0.8–3.5)
LYMPHOCYTES NFR BLD: 25 % (ref 12–49)
MAGNESIUM SERPL-MCNC: 1 MG/DL (ref 1.6–2.4)
MCH RBC QN AUTO: 30.1 PG (ref 26–34)
MCH RBC QN AUTO: 30.7 PG (ref 26–34)
MCHC RBC AUTO-ENTMCNC: 33 G/DL (ref 30–36.5)
MCHC RBC AUTO-ENTMCNC: 33.7 G/DL (ref 30–36.5)
MCV RBC AUTO: 91.2 FL (ref 80–99)
MCV RBC AUTO: 91.3 FL (ref 80–99)
METAMYELOCYTES NFR BLD MANUAL: 0 %
MONOCYTES # BLD: 0.2 K/UL (ref 0–1)
MONOCYTES NFR BLD: 4 % (ref 5–13)
MYELOCYTES NFR BLD MANUAL: 0 %
NEUTS BAND NFR BLD MANUAL: 0 % (ref 0–6)
NEUTS SEG # BLD: 2.8 K/UL (ref 1.8–8)
NEUTS SEG NFR BLD: 69 % (ref 32–75)
NRBC # BLD: 0 K/UL (ref 0–0.01)
NRBC # BLD: 0.02 K/UL (ref 0–0.01)
NRBC BLD-RTO: 0 PER 100 WBC
NRBC BLD-RTO: 0.4 PER 100 WBC
OTHER CELLS NFR BLD MANUAL: 0 %
PHOSPHATE SERPL-MCNC: 3.7 MG/DL (ref 2.6–4.7)
PLATELET # BLD AUTO: 150 K/UL (ref 150–400)
PLATELET # BLD AUTO: ABNORMAL K/UL (ref 150–400)
PMV BLD AUTO: 12 FL (ref 8.9–12.9)
POTASSIUM SERPL-SCNC: 3.7 MMOL/L (ref 3.5–5.1)
PROMYELOCYTES NFR BLD MANUAL: 0 %
RBC # BLD AUTO: 3.32 M/UL (ref 4.1–5.7)
RBC # BLD AUTO: 3.65 M/UL (ref 4.1–5.7)
RBC MORPH BLD: ABNORMAL
SODIUM SERPL-SCNC: 136 MMOL/L (ref 136–145)
WBC # BLD AUTO: 4.2 K/UL (ref 4.1–11.1)
WBC # BLD AUTO: 4.8 K/UL (ref 4.1–11.1)

## 2018-12-30 PROCEDURE — 74011250637 HC RX REV CODE- 250/637: Performed by: INTERNAL MEDICINE

## 2018-12-30 PROCEDURE — 65660000001 HC RM ICU INTERMED STEPDOWN

## 2018-12-30 PROCEDURE — 83735 ASSAY OF MAGNESIUM: CPT

## 2018-12-30 PROCEDURE — 84100 ASSAY OF PHOSPHORUS: CPT

## 2018-12-30 PROCEDURE — 74011250637 HC RX REV CODE- 250/637: Performed by: NEUROMUSCULOSKELETAL MEDICINE & OMM

## 2018-12-30 PROCEDURE — 36415 COLL VENOUS BLD VENIPUNCTURE: CPT

## 2018-12-30 PROCEDURE — 85027 COMPLETE CBC AUTOMATED: CPT

## 2018-12-30 PROCEDURE — 74011250636 HC RX REV CODE- 250/636: Performed by: INTERNAL MEDICINE

## 2018-12-30 PROCEDURE — 71275 CT ANGIOGRAPHY CHEST: CPT

## 2018-12-30 PROCEDURE — 74011250636 HC RX REV CODE- 250/636: Performed by: NEUROMUSCULOSKELETAL MEDICINE & OMM

## 2018-12-30 PROCEDURE — C9113 INJ PANTOPRAZOLE SODIUM, VIA: HCPCS | Performed by: NEUROMUSCULOSKELETAL MEDICINE & OMM

## 2018-12-30 PROCEDURE — 74011000250 HC RX REV CODE- 250: Performed by: NEUROMUSCULOSKELETAL MEDICINE & OMM

## 2018-12-30 PROCEDURE — 80048 BASIC METABOLIC PNL TOTAL CA: CPT

## 2018-12-30 RX ORDER — SODIUM CHLORIDE 0.9 % (FLUSH) 0.9 %
10 SYRINGE (ML) INJECTION
Status: DISPENSED | OUTPATIENT
Start: 2018-12-30 | End: 2018-12-31

## 2018-12-30 RX ORDER — PHENOBARBITAL 32.4 MG/1
32.4 TABLET ORAL 2 TIMES DAILY
Status: DISCONTINUED | OUTPATIENT
Start: 2018-12-30 | End: 2018-12-31

## 2018-12-30 RX ORDER — MAGNESIUM SULFATE HEPTAHYDRATE 500 MG/ML
1 INJECTION, SOLUTION INTRAMUSCULAR; INTRAVENOUS
Status: DISCONTINUED | OUTPATIENT
Start: 2018-12-30 | End: 2018-12-30

## 2018-12-30 RX ORDER — MAGNESIUM SULFATE 1 G/100ML
1 INJECTION INTRAVENOUS
Status: COMPLETED | OUTPATIENT
Start: 2018-12-30 | End: 2018-12-30

## 2018-12-30 RX ADMIN — ACETAMINOPHEN 500 MG: 500 TABLET, FILM COATED ORAL at 05:39

## 2018-12-30 RX ADMIN — ENOXAPARIN SODIUM 40 MG: 40 INJECTION, SOLUTION INTRAVENOUS; SUBCUTANEOUS at 21:01

## 2018-12-30 RX ADMIN — Medication 10 ML: at 05:39

## 2018-12-30 RX ADMIN — FOLIC ACID 1 MG: 1 TABLET ORAL at 08:41

## 2018-12-30 RX ADMIN — GABAPENTIN 300 MG: 300 CAPSULE ORAL at 17:05

## 2018-12-30 RX ADMIN — PHENOBARBITAL 32.4 MG: 32.4 TABLET ORAL at 08:41

## 2018-12-30 RX ADMIN — THERA TABS 1 TABLET: TAB at 08:41

## 2018-12-30 RX ADMIN — MAGNESIUM SULFATE HEPTAHYDRATE 1 G: 1 INJECTION, SOLUTION INTRAVENOUS at 11:26

## 2018-12-30 RX ADMIN — MAGNESIUM SULFATE HEPTAHYDRATE 1 G: 1 INJECTION, SOLUTION INTRAVENOUS at 10:21

## 2018-12-30 RX ADMIN — LORAZEPAM 2 MG: 2 INJECTION, SOLUTION INTRAMUSCULAR; INTRAVENOUS at 21:01

## 2018-12-30 RX ADMIN — Medication 10 ML: at 17:06

## 2018-12-30 RX ADMIN — GABAPENTIN 300 MG: 300 CAPSULE ORAL at 21:01

## 2018-12-30 RX ADMIN — SODIUM CHLORIDE 40 MG: 9 INJECTION INTRAMUSCULAR; INTRAVENOUS; SUBCUTANEOUS at 17:06

## 2018-12-30 RX ADMIN — Medication 10 ML: at 21:01

## 2018-12-30 RX ADMIN — Medication 100 MG: at 08:41

## 2018-12-30 RX ADMIN — PHENOBARBITAL 32.4 MG: 32.4 TABLET ORAL at 17:05

## 2018-12-30 RX ADMIN — GABAPENTIN 300 MG: 300 CAPSULE ORAL at 08:41

## 2018-12-30 NOTE — PROGRESS NOTES
Hospitalist Progress Note Subjective:  
Daily Progress Note: 2018 10:42 AM 
 
Less anxious today. Slept well overnight. No complains of sob. Current Facility-Administered Medications Medication Dose Route Frequency  magnesium sulfate 1 g/100 ml IVPB (premix or compounded)  1 g IntraVENous Q1H  
 PHENobarbital (LUMINAL) tablet 32.4 mg  32.4 mg Oral TID  acetaminophen (TYLENOL) tablet 500 mg  500 mg Oral Q6H PRN  
 LORazepam (ATIVAN) injection 2 mg  2 mg IntraVENous Q1H PRN  
 LORazepam (ATIVAN) injection 4 mg  4 mg IntraVENous Q1H PRN  
 sodium chloride (NS) flush 5-10 mL  5-10 mL IntraVENous Q8H  
 sodium chloride (NS) flush 5-10 mL  5-10 mL IntraVENous PRN  
 enoxaparin (LOVENOX) injection 40 mg  40 mg SubCUTAneous Q24H  
 gabapentin (NEURONTIN) capsule 300 mg  300 mg Oral TID  thiamine HCL (B-1) tablet 100 mg  100 mg Oral DAILY  therapeutic multivitamin (THERAGRAN) tablet 1 Tab  1 Tab Oral DAILY  folic acid (FOLVITE) tablet 1 mg  1 mg Oral DAILY Review of Systems A comprehensive review of systems was negative except for that written in the HPI. Objective:  
 
Visit Vitals BP (!) 140/92 (BP 1 Location: Left arm) Pulse (!) 101 Temp 99.6 °F (37.6 °C) Resp 18 Ht 5' 9\" (1.753 m) Wt 78 kg (172 lb) SpO2 99% BMI 25.40 kg/m² O2 Flow Rate (L/min): 2 l/min O2 Device: Room air Temp (24hrs), Av.5 °F (37.5 °C), Min:98.6 °F (37 °C), Max:100.9 °F (38.3 °C) No intake/output data recorded.  1901 -  0700 In: 3416.7 [P.O.:720; I.V.:2696.7] Out: 4325 [[id number]] Visit Vitals BP (!) 140/92 (BP 1 Location: Left arm) Pulse (!) 101 Temp 99.6 °F (37.6 °C) Resp 18 Ht 5' 9\" (1.753 m) Wt 78 kg (172 lb) SpO2 99% BMI 25.40 kg/m² General:  Alert, cooperative, no distress, appears stated age. Head:  Normocephalic, without obvious abnormality, atraumatic. Eyes:  Conjunctivae/corneas clear. PERRL, EOMs intact. Fundi benign Ears:  Normal TMs and external ear canals both ears. Nose: Nares normal. Septum midline. Mucosa normal. No drainage or sinus tenderness. Throat: Lips, mucosa, and tongue normal. Teeth and gums normal.  
Neck: Supple, symmetrical, trachea midline, no adenopathy, thyroid: no enlargement/tenderness/nodules, no carotid bruit and no JVD. Back:   Symmetric, no curvature. ROM normal. No CVA tenderness. Lungs:   Clear to auscultation bilaterally. Chest wall:  No tenderness or deformity. Heart:  Regular rate and rhythm, S1, S2 normal, no murmur, click, rub or gallop. Abdomen:   Soft, non-tender. Bowel sounds normal. No masses,  No organomegaly. Genitalia:  Normal male without lesion, discharge or tenderness. Rectal:  Normal tone, normal prostate, no masses or tenderness Guaiac negative stool. Extremities: Extremities normal, atraumatic, no cyanosis or edema. Pulses: 2+ and symmetric all extremities. Skin: Skin color, texture, turgor normal. No rashes or lesions Lymph nodes: Cervical, supraclavicular, and axillary nodes normal.  
Neurologic: CNII-XII intact. Normal strength, sensation and reflexes throughout. Additional comments:I reviewed the patient's new clinical lab test results. wbc improved. Data Review Recent Results (from the past 24 hour(s)) CBC WITH MANUAL DIFF Collection Time: 12/30/18  5:38 AM  
Result Value Ref Range WBC 4.2 4.1 - 11.1 K/uL  
 RBC 3.65 (L) 4.10 - 5.70 M/uL  
 HGB 11.0 (L) 12.1 - 17.0 g/dL HCT 33.3 (L) 36.6 - 50.3 % MCV 91.2 80.0 - 99.0 FL  
 MCH 30.1 26.0 - 34.0 PG  
 MCHC 33.0 30.0 - 36.5 g/dL  
 RDW 15.3 (H) 11.5 - 14.5 % PLATELET  227 - 652 K/uL UNABLE TO REPORT ACCURATE COUNT DUE TO PLATELET AGGREGATION, HOWEVER, PLATELETS APPEAR DECREASED IN NUMBER ON SMEAR. PLEASE RESUBMIT SODIUM CITRATE (BLUE) AND EDTA (LAVENDAR) TUBES FOR HEMATOLOGICAL TESTING. NRBC 0.0 0  WBC ABSOLUTE NRBC 0.00 0.00 - 0.01 K/uL NEUTROPHILS 69 32 - 75 % BAND NEUTROPHILS 0 0 - 6 % LYMPHOCYTES 25 12 - 49 % MONOCYTES 4 (L) 5 - 13 % EOSINOPHILS 2 0 - 7 % BASOPHILS 0 0 - 1 % METAMYELOCYTES 0 0 % MYELOCYTES 0 0 % PROMYELOCYTES 0 0 % BLASTS 0 0 % OTHER CELL 0 0 IMMATURE GRANULOCYTES 0 %  
 ABS. NEUTROPHILS 2.8 1.8 - 8.0 K/UL  
 ABS. LYMPHOCYTES 1.1 0.8 - 3.5 K/UL  
 ABS. MONOCYTES 0.2 0.0 - 1.0 K/UL  
 ABS. EOSINOPHILS 0.1 0.0 - 0.4 K/UL  
 ABS. BASOPHILS 0.0 0.0 - 0.1 K/UL  
 ABS. IMM. GRANS. 0.0 K/UL  
 DF SMEAR SCANNED    
 RBC COMMENTS ANISOCYTOSIS 
1+ METABOLIC PANEL, BASIC Collection Time: 12/30/18  5:38 AM  
Result Value Ref Range Sodium 136 136 - 145 mmol/L Potassium 3.7 3.5 - 5.1 mmol/L Chloride 99 97 - 108 mmol/L  
 CO2 27 21 - 32 mmol/L Anion gap 10 5 - 15 mmol/L Glucose 108 (H) 65 - 100 mg/dL BUN 11 6 - 20 MG/DL Creatinine 0.83 0.70 - 1.30 MG/DL  
 BUN/Creatinine ratio 13 12 - 20 GFR est AA >60 >60 ml/min/1.73m2 GFR est non-AA >60 >60 ml/min/1.73m2 Calcium 9.2 8.5 - 10.1 MG/DL MAGNESIUM Collection Time: 12/30/18  5:38 AM  
Result Value Ref Range Magnesium 1.0 (L) 1.6 - 2.4 mg/dL PHOSPHORUS Collection Time: 12/30/18  5:38 AM  
Result Value Ref Range Phosphorus 3.7 2.6 - 4.7 MG/DL Assessment/Plan:  
 
Principal Problem: 
  Al[facility] with moderate or [facility] (12/27/2018) Active Problems: 
  Syncope (12/27/2018) Stage 3 chronic kidney disease ([facility] (12/27/2018) Alcoholic hepatitis (92/98/9951) Non-traumatic rhabdomyolysis (12/27/2018) Electrolyte abnormality with severy hypomagnesemia Alcohol intoxication delirium with moderate or severe use disorder:POA Alcohol hepatitis CIWA protocol, MVI, thiamin an dfolate. Risk for alcohol withdrawal seizures PRN Ativan 
wean phenobarb to bid today 30mg. Hypomagnesemia - give 2gm iv magnesium and recheck in am. 
 
Syncopal episode Multiple falls per family Mild left nasolabial droop, unclear whether this is patient's baseline or something new Head CT- unremarkable Monitor on telemetry,check orthostatics Neuro consulted. Defer MRI/MRA to neurology 
  
Lactic acidosis/starvation ketosis LA 4.3,c/w ivf Recheck LA Nontraumatic rhabdomyolysis Ck stable, crt stable. Marijuana abuse UDS positive for THC  
 
Tachycardia - liley related to withdrawal from etoh. If persists tomorrow will check echo. Check tsh,  
  
Body mass index is 25.4 kg/m². Code Status: full Surrogate Decision Maker:niece 
  
DVT Prophylaxis: Lovenox GI Prophylaxis: not indicated 
  
  
 
 
 
 
Care Plan discussed with: Patient/Family Total time spent with patient/care team: 30 minutes. Signed By: Jatin Doyle DO   
 December 30, 2018

## 2018-12-30 NOTE — PROGRESS NOTES
Bedside and Verbal shift change report given to Vivian Weller (oncoming nurse) by Laya (offgoing nurse). Report included the following information SBAR, Kardex, MAR, Accordion, Recent Results and Cardiac Rhythm nsr to sinus tachycardia. Patient did not receive Ativan overnight due to lethargy. 9:14 AM [address]ed Dr. Kevin Beltre regarding Mg level 1.0 in AM labs. The following orders were received: Dr Kevin Beltre will place orders. Bedside and Verbal shift change report given to Prudencio Pierce (oncoming nurse) by Vivian Weller (offgoing nurse). Report included the following information SBAR, Kardex, MAR, Accordion, Recent Results and Cardiac Rhythm sinus tachycardia. Patient was up in chair with two assist for lunch and dinner. Returned to bed; seizure pads intact.

## 2018-12-30 NOTE — PROGRESS NOTES
Problem: Alcohol Withdrawal 
Goal: *STG: Vital signs within defined limits Outcome: Not Met Patient remains tachycardic at rest, MD aware Problem: Seizure Disorder (Adult) Goal: *STG: Maintains lab values within therapeutic range Outcome: Progressing Towards Goal 
Mg level was low this morning. IV magnesium is being repleted and levels will be reassessed in the morning. Problem: Falls - Risk of 
Goal: *Absence of Falls Document Jillian Jesus Fall Risk and appropriate interventions in the flowsheet. Outcome: Progressing Towards Goal 
Fall Risk Interventions: 
Mobility Interventions: PT Consult for mobility concerns, Patient to call before getting OOB, Communicate number of staff needed for ambulation/transfer, Assess mobility with egress test 
 
  
 
Medication Interventions: Patient to call before getting OOB, Teach patient to arise slowly, Evaluate medications/consider consulting pharmacy Elimination Interventions: Toileting schedule/hourly rounds, Patient to call for help with toileting needs, Call light in reach, Toilet paper/wipes in reach History of Falls Interventions: Door open when patient unattended, Room close to nurse's station, Consult care management for discharge planning Problem: Pressure Injury - Risk of 
Goal: *Prevention of pressure injury Document Willard Scale and appropriate interventions in the flowsheet. Outcome: Progressing Towards Goal 
Pressure Injury Interventions: 
  
 
Moisture Interventions: Minimize layers, Check for incontinence Q2 hours and as needed, Internal/External urinary devices, Limit adult briefs, Absorbent underpads Activity Interventions: Pressure redistribution bed/mattress(bed type), PT/OT evaluation Mobility Interventions: Pressure redistribution bed/mattress (bed type), PT/OT evaluation Nutrition Interventions: Offer support with meals,snacks and hydration, Document food/fluid/supplement intake

## 2018-12-30 NOTE — PROGRESS NOTES
Bedside verbal report received from previous RNGibson. Pt asleep in bed. Seizure pads remain in place.

## 2018-12-30 NOTE — PROGRESS NOTES
Problem: Falls - Risk of 
Goal: *Absence of Falls Document Arlene Reyes Fall Risk and appropriate interventions in the flowsheet. Outcome: Progressing Towards Goal 
Fall Risk Interventions: 
Mobility Interventions: Assess mobility with egress test, Communicate number of staff needed for ambulation/transfer, OT consult for ADLs, PT Consult for mobility concerns, Patient to call before getting OOB Medication Interventions: Patient to call before getting OOB, Teach patient to arise slowly, Evaluate medications/consider consulting pharmacy Elimination Interventions: Call light in reach, Toilet paper/wipes in reach, Patient to call for help with toileting needs, Toileting schedule/hourly rounds History of Falls Interventions: Door open when patient unattended, Investigate reason for fall, Room close to nurse's station, Evaluate medications/consider consulting pharmacy, Consult care management for discharge planning Problem: Pressure Injury - Risk of 
Goal: *Prevention of pressure injury Document Willard Scale and appropriate interventions in the flowsheet. Outcome: Progressing Towards Goal 
Pressure Injury Interventions: 
  
 
Moisture Interventions: Absorbent underpads, Internal/External fecal devices, Check for incontinence Q2 hours and as needed, Limit adult briefs, Maintain skin hydration (lotion/cream) Activity Interventions: Pressure redistribution bed/mattress(bed type), Increase time out of bed, PT/OT evaluation Mobility Interventions: HOB 30 degrees or less, Pressure redistribution bed/mattress (bed type), PT/OT evaluation Nutrition Interventions: Document food/fluid/supplement intake, Offer support with meals,snacks and hydration

## 2018-12-31 ENCOUNTER — APPOINTMENT (OUTPATIENT)
Dept: GENERAL RADIOLOGY | Age: 53
DRG: 897 | End: 2018-12-31
Attending: NEUROMUSCULOSKELETAL MEDICINE & OMM
Payer: MEDICARE

## 2018-12-31 LAB
ALBUMIN SERPL-MCNC: 2.7 G/DL (ref 3.5–5)
ALBUMIN SERPL-MCNC: 2.8 G/DL (ref 3.5–5)
ALBUMIN/GLOB SERPL: 0.6 {RATIO} (ref 1.1–2.2)
ALBUMIN/GLOB SERPL: 0.6 {RATIO} (ref 1.1–2.2)
ALP SERPL-CCNC: 62 U/L (ref 45–117)
ALP SERPL-CCNC: 63 U/L (ref 45–117)
ALT SERPL-CCNC: 26 U/L (ref 12–78)
ALT SERPL-CCNC: 27 U/L (ref 12–78)
ANION GAP SERPL CALC-SCNC: 10 MMOL/L (ref 5–15)
AST SERPL-CCNC: 55 U/L (ref 15–37)
AST SERPL-CCNC: 57 U/L (ref 15–37)
BILIRUB DIRECT SERPL-MCNC: 0.2 MG/DL (ref 0–0.2)
BILIRUB SERPL-MCNC: 0.3 MG/DL (ref 0.2–1)
BILIRUB SERPL-MCNC: 0.5 MG/DL (ref 0.2–1)
BUN SERPL-MCNC: 17 MG/DL (ref 6–20)
BUN/CREAT SERPL: 20 (ref 12–20)
CALCIUM SERPL-MCNC: 9.1 MG/DL (ref 8.5–10.1)
CHLORIDE SERPL-SCNC: 98 MMOL/L (ref 97–108)
CK SERPL-CCNC: 122 U/L (ref 39–308)
CO2 SERPL-SCNC: 26 MMOL/L (ref 21–32)
CREAT SERPL-MCNC: 0.84 MG/DL (ref 0.7–1.3)
ERYTHROCYTE [DISTWIDTH] IN BLOOD BY AUTOMATED COUNT: 15 % (ref 11.5–14.5)
GLOBULIN SER CALC-MCNC: 4.4 G/DL (ref 2–4)
GLOBULIN SER CALC-MCNC: 4.5 G/DL (ref 2–4)
GLUCOSE SERPL-MCNC: 92 MG/DL (ref 65–100)
HCT VFR BLD AUTO: 29.7 % (ref 36.6–50.3)
HGB BLD-MCNC: 9.9 G/DL (ref 12.1–17)
MAGNESIUM SERPL-MCNC: 1.3 MG/DL (ref 1.6–2.4)
MCH RBC QN AUTO: 30.1 PG (ref 26–34)
MCHC RBC AUTO-ENTMCNC: 33.3 G/DL (ref 30–36.5)
MCV RBC AUTO: 90.3 FL (ref 80–99)
NRBC # BLD: 0 K/UL (ref 0–0.01)
NRBC BLD-RTO: 0 PER 100 WBC
PLATELET # BLD AUTO: 101 K/UL (ref 150–400)
PLATELET # BLD AUTO: 97 K/UL (ref 150–400)
PMV BLD AUTO: 12.2 FL (ref 8.9–12.9)
POTASSIUM SERPL-SCNC: 4 MMOL/L (ref 3.5–5.1)
PROT SERPL-MCNC: 7.2 G/DL (ref 6.4–8.2)
PROT SERPL-MCNC: 7.2 G/DL (ref 6.4–8.2)
RBC # BLD AUTO: 3.29 M/UL (ref 4.1–5.7)
SODIUM SERPL-SCNC: 134 MMOL/L (ref 136–145)
TSH SERPL DL<=0.05 MIU/L-ACNC: 2.76 UIU/ML (ref 0.36–3.74)
WBC # BLD AUTO: 4.7 K/UL (ref 4.1–11.1)

## 2018-12-31 PROCEDURE — 85049 AUTOMATED PLATELET COUNT: CPT

## 2018-12-31 PROCEDURE — 74011250636 HC RX REV CODE- 250/636: Performed by: NEUROMUSCULOSKELETAL MEDICINE & OMM

## 2018-12-31 PROCEDURE — 74011250637 HC RX REV CODE- 250/637: Performed by: NEUROMUSCULOSKELETAL MEDICINE & OMM

## 2018-12-31 PROCEDURE — 95816 EEG AWAKE AND DROWSY: CPT | Performed by: PSYCHIATRY & NEUROLOGY

## 2018-12-31 PROCEDURE — 86592 SYPHILIS TEST NON-TREP QUAL: CPT

## 2018-12-31 PROCEDURE — 80076 HEPATIC FUNCTION PANEL: CPT

## 2018-12-31 PROCEDURE — 74220 X-RAY XM ESOPHAGUS 1CNTRST: CPT

## 2018-12-31 PROCEDURE — 83735 ASSAY OF MAGNESIUM: CPT

## 2018-12-31 PROCEDURE — 97535 SELF CARE MNGMENT TRAINING: CPT | Performed by: OCCUPATIONAL THERAPIST

## 2018-12-31 PROCEDURE — 82550 ASSAY OF CK (CPK): CPT

## 2018-12-31 PROCEDURE — 74011250636 HC RX REV CODE- 250/636: Performed by: INTERNAL MEDICINE

## 2018-12-31 PROCEDURE — 97116 GAIT TRAINING THERAPY: CPT | Performed by: PHYSICAL THERAPIST

## 2018-12-31 PROCEDURE — 82164 ANGIOTENSIN I ENZYME TEST: CPT

## 2018-12-31 PROCEDURE — 97165 OT EVAL LOW COMPLEX 30 MIN: CPT | Performed by: OCCUPATIONAL THERAPIST

## 2018-12-31 PROCEDURE — 74011000250 HC RX REV CODE- 250: Performed by: NEUROMUSCULOSKELETAL MEDICINE & OMM

## 2018-12-31 PROCEDURE — C9113 INJ PANTOPRAZOLE SODIUM, VIA: HCPCS | Performed by: NEUROMUSCULOSKELETAL MEDICINE & OMM

## 2018-12-31 PROCEDURE — 93306 TTE W/DOPPLER COMPLETE: CPT

## 2018-12-31 PROCEDURE — 80053 COMPREHEN METABOLIC PANEL: CPT

## 2018-12-31 PROCEDURE — 36415 COLL VENOUS BLD VENIPUNCTURE: CPT

## 2018-12-31 PROCEDURE — 85027 COMPLETE CBC AUTOMATED: CPT

## 2018-12-31 PROCEDURE — 84443 ASSAY THYROID STIM HORMONE: CPT

## 2018-12-31 PROCEDURE — 74011250637 HC RX REV CODE- 250/637: Performed by: INTERNAL MEDICINE

## 2018-12-31 PROCEDURE — 65660000001 HC RM ICU INTERMED STEPDOWN

## 2018-12-31 RX ORDER — MAGNESIUM SULFATE 1 G/100ML
1 INJECTION INTRAVENOUS
Status: COMPLETED | OUTPATIENT
Start: 2018-12-31 | End: 2018-12-31

## 2018-12-31 RX ORDER — METOPROLOL TARTRATE 25 MG/1
25 TABLET, FILM COATED ORAL EVERY 12 HOURS
Status: DISCONTINUED | OUTPATIENT
Start: 2018-12-31 | End: 2019-01-04 | Stop reason: HOSPADM

## 2018-12-31 RX ADMIN — MAGNESIUM SULFATE HEPTAHYDRATE 1 G: 1 INJECTION, SOLUTION INTRAVENOUS at 12:17

## 2018-12-31 RX ADMIN — LORAZEPAM 2 MG: 2 INJECTION, SOLUTION INTRAMUSCULAR; INTRAVENOUS at 17:39

## 2018-12-31 RX ADMIN — MAGNESIUM SULFATE HEPTAHYDRATE 1 G: 1 INJECTION, SOLUTION INTRAVENOUS at 12:00

## 2018-12-31 RX ADMIN — LORAZEPAM 2 MG: 2 INJECTION, SOLUTION INTRAMUSCULAR; INTRAVENOUS at 03:24

## 2018-12-31 RX ADMIN — Medication 100 MG: at 09:09

## 2018-12-31 RX ADMIN — FOLIC ACID 1 MG: 1 TABLET ORAL at 09:09

## 2018-12-31 RX ADMIN — METOPROLOL TARTRATE 25 MG: 25 TABLET ORAL at 14:21

## 2018-12-31 RX ADMIN — THERA TABS 1 TABLET: TAB at 09:09

## 2018-12-31 RX ADMIN — METOPROLOL TARTRATE 25 MG: 25 TABLET ORAL at 21:45

## 2018-12-31 RX ADMIN — Medication 10 ML: at 06:00

## 2018-12-31 RX ADMIN — GABAPENTIN 300 MG: 300 CAPSULE ORAL at 17:45

## 2018-12-31 RX ADMIN — ENOXAPARIN SODIUM 40 MG: 40 INJECTION, SOLUTION INTRAVENOUS; SUBCUTANEOUS at 22:23

## 2018-12-31 RX ADMIN — SODIUM CHLORIDE 40 MG: 9 INJECTION INTRAMUSCULAR; INTRAVENOUS; SUBCUTANEOUS at 09:30

## 2018-12-31 RX ADMIN — Medication 10 ML: at 14:26

## 2018-12-31 RX ADMIN — PHENOBARBITAL 32.4 MG: 32.4 TABLET ORAL at 09:09

## 2018-12-31 RX ADMIN — GABAPENTIN 300 MG: 300 CAPSULE ORAL at 21:45

## 2018-12-31 RX ADMIN — GABAPENTIN 300 MG: 300 CAPSULE ORAL at 09:09

## 2018-12-31 RX ADMIN — Medication 10 ML: at 21:45

## 2018-12-31 RX ADMIN — ACETAMINOPHEN 500 MG: 500 TABLET, FILM COATED ORAL at 10:31

## 2018-12-31 NOTE — PROGRESS NOTES
Hospitalist Progress Note Subjective:  
Daily Progress Note: 2018 10:42 AM 
 
Slept well reports some difficulty swallowing but tolerating regular diet nontheless. Current Facility-Administered Medications Medication Dose Route Frequency  PHENobarbital (LUMINAL) tablet 32.4 mg  32.4 mg Oral BID  pantoprazole (PROTONIX) 40 mg in sodium chloride 0.9% 10 mL injection  40 mg IntraVENous DAILY  acetaminophen (TYLENOL) tablet 500 mg  500 mg Oral Q6H PRN  
 LORazepam (ATIVAN) injection 2 mg  2 mg IntraVENous Q1H PRN  
 LORazepam (ATIVAN) injection 4 mg  4 mg IntraVENous Q1H PRN  
 sodium chloride (NS) flush 5-10 mL  5-10 mL IntraVENous Q8H  
 sodium chloride (NS) flush 5-10 mL  5-10 mL IntraVENous PRN  
 enoxaparin (LOVENOX) injection 40 mg  40 mg SubCUTAneous Q24H  
 gabapentin (NEURONTIN) capsule 300 mg  300 mg Oral TID  thiamine HCL (B-1) tablet 100 mg  100 mg Oral DAILY  therapeutic multivitamin (THERAGRAN) tablet 1 Tab  1 Tab Oral DAILY  folic acid (FOLVITE) tablet 1 mg  1 mg Oral DAILY Review of Systems A comprehensive review of systems was negative except for that written in the HPI. Objective:  
 
Visit Vitals /60 (BP 1 Location: Left arm, BP Patient Position: Sitting) Pulse (!) 130 Temp 98.3 °F (36.8 °C) Resp 20 Ht 5' 9\" (1.753 m) Wt 78 kg (172 lb) SpO2 96% BMI 25.40 kg/m² O2 Flow Rate (L/min): 2 l/min O2 Device: Room air Temp (24hrs), Av.2 °F (37.3 °C), Min:98.2 °F (36.8 °C), Max:99.9 °F (37.7 °C) 
 
 
701 - 1900 In: -  
Out: 100 [Urine:100] 1901 - 700 In: 240 [P.O.:240] Out: Nick Hyman [[id number]] Visit Vitals /60 (BP 1 Location: Left arm, BP Patient Position: Sitting) Pulse (!) 130 Temp 98.3 °F (36.8 °C) Resp 20 Ht 5' 9\" (1.753 m) Wt 78 kg (172 lb) SpO2 96% BMI 25.40 kg/m² General:  Alert, cooperative, no distress, appears stated age. Head:  Normocephalic, without obvious abnormality, atraumatic. Eyes:  Conjunctivae/corneas clear. PERRL, EOMs intact. Fundi benign Ears:  Normal TMs and external ear canals both ears. Nose: Nares normal. Septum midline. Mucosa normal. No drainage or sinus tenderness. Throat: Lips, mucosa, and tongue normal. Teeth and gums normal.  
Neck: Supple, symmetrical, trachea midline, no adenopathy, thyroid: no enlargement/tenderness/nodules, no carotid bruit and no JVD. Back:   Symmetric, no curvature. ROM normal. No CVA tenderness. Lungs:   Clear to auscultation bilaterally. Chest wall:  No tenderness or deformity. Heart:  Regular rate and rhythm, S1, S2 normal, no murmur, click, rub or gallop. Abdomen:   Soft, non-tender. Bowel sounds normal. No masses,  No organomegaly. Genitalia:  Normal male without lesion, discharge or tenderness. Rectal:  Normal tone, normal prostate, no masses or tenderness Guaiac negative stool. Extremities: Extremities normal, atraumatic, no cyanosis or edema. Pulses: 2+ and symmetric all extremities. Skin: Skin color, texture, turgor normal. No rashes or lesions Lymph nodes: Cervical, supraclavicular, and axillary nodes normal.  
Neurologic: CNII-XII intact. Normal strength, sensation and reflexes throughout. Additional comments:I reviewed the patient's new clinical lab test results. wnl Data Review Recent Results (from the past 24 hour(s)) CBC W/O DIFF Collection Time: 12/30/18  7:47 PM  
Result Value Ref Range WBC 4.8 4.1 - 11.1 K/uL  
 RBC 3.32 (L) 4.10 - 5.70 M/uL  
 HGB 10.2 (L) 12.1 - 17.0 g/dL HCT 30.3 (L) 36.6 - 50.3 % MCV 91.3 80.0 - 99.0 FL  
 MCH 30.7 26.0 - 34.0 PG  
 MCHC 33.7 30.0 - 36.5 g/dL  
 RDW 15.2 (H) 11.5 - 14.5 % PLATELET 245 641 - 642 K/uL MPV 12.0 8.9 - 12.9 FL  
 NRBC 0.4 (H) 0  WBC ABSOLUTE NRBC 0.02 (H) 0.00 - 0.01 K/uL CBC W/O DIFF Collection Time: 12/31/18  3:14 AM  
Result Value Ref Range WBC 4.7 4.1 - 11.1 K/uL  
 RBC 3.29 (L) 4.10 - 5.70 M/uL HGB 9.9 (L) 12.1 - 17.0 g/dL HCT 29.7 (L) 36.6 - 50.3 % MCV 90.3 80.0 - 99.0 FL  
 MCH 30.1 26.0 - 34.0 PG  
 MCHC 33.3 30.0 - 36.5 g/dL  
 RDW 15.0 (H) 11.5 - 14.5 % PLATELET 97 (L) 303 - 400 K/uL MPV 12.2 8.9 - 12.9 FL  
 NRBC 0.0 0  WBC ABSOLUTE NRBC 0.00 0.00 - 0.01 K/uL MAGNESIUM Collection Time: 12/31/18  3:14 AM  
Result Value Ref Range Magnesium 1.3 (L) 1.6 - 2.4 mg/dL METABOLIC PANEL, COMPREHENSIVE Collection Time: 12/31/18  3:14 AM  
Result Value Ref Range Sodium 134 (L) 136 - 145 mmol/L Potassium 4.0 3.5 - 5.1 mmol/L Chloride 98 97 - 108 mmol/L  
 CO2 26 21 - 32 mmol/L Anion gap 10 5 - 15 mmol/L Glucose 92 65 - 100 mg/dL BUN 17 6 - 20 MG/DL Creatinine 0.84 0.70 - 1.30 MG/DL  
 BUN/Creatinine ratio 20 12 - 20 GFR est AA >60 >60 ml/min/1.73m2 GFR est non-AA >60 >60 ml/min/1.73m2 Calcium 9.1 8.5 - 10.1 MG/DL Bilirubin, total 0.5 0.2 - 1.0 MG/DL  
 ALT (SGPT) 27 12 - 78 U/L  
 AST (SGOT) 57 (H) 15 - 37 U/L Alk. phosphatase 62 45 - 117 U/L Protein, total 7.2 6.4 - 8.2 g/dL Albumin 2.7 (L) 3.5 - 5.0 g/dL Globulin 4.5 (H) 2.0 - 4.0 g/dL A-G Ratio 0.6 (L) 1.1 - 2.2 TSH 3RD GENERATION Collection Time: 12/31/18  3:14 AM  
Result Value Ref Range TSH 2.76 0.36 - 3.74 uIU/mL Assessment/Plan:  
 
Principal Problem: 
  Alcohol intoxication delirium with moderate or severe use disorder ([facility] (12/27/2018) Active Problems: 
  Syncope (12/27/2018) Stage 3 chronic kidney disease ([facility] (12/27/2018) Alcoholic hepatitis (39/96/3104) Non-traumatic rhabdomyolysis (12/27/2018) Electrolyte abnormality with severy hypomagnesemia Still low give another 2gm iv magnesium 
  
Alcohol intoxication delirium with moderate or severe use disorder:POA Alcohol hepatitis CIWA protocol, MVI, thiamin an dfolate. Risk for alcohol withdrawal seizures PRN Ativan Dc phenobarb 
  
Dilated esophagus on ct chest - check esophagram. 
 
Syncopal episode Multiple falls per family Mild left nasolabial droop, unclear whether this is patient's baseline or something new Head CT- unremarkable Monitor on telemetry,check orthostatics Neuro consulted. Defer MRI/MRA to neurology 
  
Lactic acidosis/starvation ketosis LA 4.3,c/w ivf Recheck LA Nontraumatic rhabdomyolysis Ck stable, crt stable. Marijuana abuse UDS positive for [facility] [place] 
  
Tachycardia - cta chest wnl. Check echo. coonsider bblocker if normal. 
Body mass index is 25.4 kg/m². Code Status: full Surrogate Decision Maker:niece 
  
DVT Prophylaxis: Lovenox GI Prophylaxis: not indicated 
  
  
 
 
 
 
Care Plan discussed with: Patient/Family Total time spent with patient/care team: 30 minutes. Signed By: Migdalia Alicea DO   
 December 31, 2018

## 2018-12-31 NOTE — PROGRESS NOTES
Problem: Risk for Spread of Infection Goal: Prevent transmission of infectious organism to others Prevent the transmission of infectious organisms to other patients, staff members, and visitors. Outcome: Progressing Towards Goal 
Proper hand hygiene utilized when entering and exiting room. Proper use of PPE used when entering and exiting room Problem: Falls - Risk of 
Goal: *Absence of Falls Document Hussein Shaikh Fall Risk and appropriate interventions in the flowsheet. Outcome: Progressing Towards Goal 
Fall Risk Interventions: 
Mobility Interventions: PT Consult for mobility concerns Mentation Interventions: Door open when patient unattended Medication Interventions: Patient to call before getting OOB Elimination Interventions: Bed/chair exit alarm History of Falls Interventions: Door open when patient unattended Problem: Pressure Injury - Risk of 
Goal: *Prevention of pressure injury Document Willard Scale and appropriate interventions in the flowsheet. Outcome: Progressing Towards Goal 
Pressure Injury Interventions: 
  
 
Moisture Interventions: Absorbent underpads, Minimize layers Activity Interventions: Increase time out of bed Mobility Interventions: HOB 30 degrees or less Nutrition Interventions: Document food/fluid/supplement intake

## 2018-12-31 NOTE — PROGRESS NOTES
Problem: Self Care Deficits Care Plan (Adult) Goal: *Acute Goals and Plan of Care (Insert Text) Occupational Therapy Goals Initiated 12/31/2018 1. Patient will perform grooming and bathing standing at sink with contact guard assist within 7 day(s). 2.  Patient will perform lower body dressing with minimal assistance/contact guard assist within 7 day(s). 3.  Patient will perform toilet transfers with minimal assistance/contact guard assist using best DME for safety within 7 day(s). 4.  Patient will perform all aspects of toileting with minimal assistance/contact guard assist within 7 day(s). 5.  Patient will participate in upper extremity therapeutic exercise/activities with independence for 10 minutes within 7 day(s). 6.  Patient will utilize energy conservation techniques during functional activities with verbal and visual cues within 7 day(s). Occupational Therapy EVALUATION Patient: Suzanne Root (88 y.o. male) Date: 12/31/2018 Primary Diagnosis: Alcohol intoxication delirium with moderate or severe use disorder ([facility] [address] Precautions:  Fall ASSESSMENT : 
Based on the objective data described below, the patient presents with slow processing, impaired coordination with tremors and ataxia, decreased strength, endurance, mobility, balance in sitting and standing and safety following admission for ETOH intoxication. HR at rest and with activity in the 120s. He currently requires up to max A for LE ADLs and toileting and mod A for brief functional mobility from chair to [facility] to bed. Pt resides with his niece, states he is mod I with ADLs and amb and uses public transportation. Recommend IP rehab at discharge. Patient will benefit from skilled intervention to address the above impairments. Patients rehabilitation potential is considered to be Guarded Factors which may influence rehabilitation potential include:  
[]             None noted []             Mental ability/status [x]             Medical condition []             Home/family situation and support systems [x]             Safety awareness []             Pain tolerance/management 
[]             Other: PLAN : 
Recommendations and Planned Interventions: 
[x]               Self Care Training                  [x]        Therapeutic Activities [x]               Functional Mobility Training    [x]        Cognitive Retraining 
[x]               Therapeutic Exercises           [x]        Endurance Activities [x]               Balance Training                   [x]        Neuromuscular Re-Education []               Visual/Perceptual Training     [x]   Home Safety Training 
[x]               Patient Education                 [x]        Family Training/Education []               Other (comment): Frequency/Duration: Patient will be followed by occupational therapy 3 times a week to address goals. Discharge Recommendations: Inpatient Rehab Further Equipment Recommendations for Discharge: TBD SUBJECTIVE:  
Patient stated I feel ok.  OBJECTIVE DATA SUMMARY:  
HISTORY:  
Past Medical History:  
Diagnosis Date  Alcohol abuse  Asthma  Hypertension History reviewed. No pertinent surgical history. Prior Level of Function/Environment/Context:  Pt resides with his niece, states he is mod I with ADLs and amb and uses public transportation Home Situation Home Environment: Apartment # Steps to Enter: 1 Rails to Enter: No 
One/Two Story Residence: Two story # of Interior Steps: 13 Interior Rails: Right Living Alone: No(pt lives with his niece) Support Systems: Family member(s) Patient Expects to be Discharged to[de-identified] [facility] Current DME Used/Available at Home: None Tub or Shower Type: Tub/Shower combination Hand dominance: RightEXAMINATION OF PERFORMANCE DEFICITS: 
Cognitive/Behavioral Status: 
Neurologic State: Alert Orientation Level: Oriented X4 Cognition: Follows commands;Poor safety awareness Perception: Cues to maintain midline in standing; Tactile;Verbal;Visual 
Perseveration: No perseveration noted Safety/Judgement: Awareness of environment;Decreased awareness of need for safety; Fall prevention; Insight into deficits Hearing: Auditory Auditory Impairment: None Hearing Aids/Status: Other (comment)(none) Vision/Perceptual:   
 
Acuity: Able to read clock/calendar on wall without difficulty; Able to read employee name badge without difficulty Corrective Lenses: Reading glasses Range of Motion: 
AROM: Generally decreased, functional 
PROM: Generally decreased, functional 
  
  
  
  
  
  
Strength: 
Strength: Generally decreased, functional 
  
  
  
  
Coordination: 
Coordination: Generally decreased, functional(ataxia and BUE tremors noted) Fine Motor Skills-Upper: Left Impaired;Right Impaired Gross Motor Skills-Upper: Left Intact; Right Intact Tone & Sensation: 
Tone: Normal 
Sensation: Impaired(B feet- numbness in toes per pt) Balance: 
Sitting: Impaired Sitting - Static: Good (unsupported) Sitting - Dynamic: Fair (occasional) Standing: Impaired; With support Standing - Static: Fair Standing - Dynamic : Poor Functional Mobility and Transfers for ADLs: 
Transfers: 
Sit to Stand: Moderate assistance;Assist x1;Additional time(A for forward wt shift) Stand to Sit: Minimum assistance; Additional time;Assist x1 Toilet Transfer : Moderate assistance; Additional time;Assist x1(to BSC beside chair/bed) ADL Assessment and Intervention: 
Feeding: Setup; Additional time(imp coordination to open containers and cut food) Oral Facial Hygiene/Grooming: Setup; Additional time(seated- A to open containers; mod A in standing) Bathing: Moderate assistance; Additional time;Assist x1(seated- A to reach buttocks and feet; poor standing) Upper Body Dressing: Minimum assistance; Additional time;Assist x1(A to manage fasteners) Lower Body Dressing: Maximum assistance; Additional time;Assist x1(A to reach feet, manage fasteners and safety with standing) Toileting: Maximum assistance; Additional time;Assist x1(A for hygiene and clothing) Cognitive Retraining Safety/Judgement: Awareness of environment;Decreased awareness of need for safety; Fall prevention; Insight into deficits Functional Measure: 
Barthel Index: 
 
Bathin Bladder: 10 Bowels: 10 
Groomin Dressin Feedin Mobility: 0 Stairs: 0 Toilet Use: 5 Transfer (Bed to Chair and Back): 5 Total: 40 Barthel and G-code impairment scale: 
Percentage of impairment CH 
0% CI 
1-19% CJ 
20-39% CK 
40-59% CL 
60-79% CM 
80-99% CN 
100% Barthel Score 0-100 100 99-80 79-60 59-40 20-39 1-19 
 0 Barthel Score 0-20 20 17-19 13-16 9-12 5-8 1-4 0 The Barthel ADL Index: Guidelines 1. The index should be used as a record of what a patient does, not as a record of what a patient could do. 2. The main aim is to establish degree of independence from any help, physical or verbal, however minor and for whatever reason. 3. The need for supervision renders the patient not independent. 4. A patient's performance should be established using the best available evidence. Asking the patient, friends/relatives and nurses are the usual sources, but direct observation and common sense are also important. However direct testing is not needed. 5. Usually the patient's performance over the preceding 24-48 hours is important, but occasionally longer periods will be relevant. 6. Middle categories imply that the patient supplies over 50 per cent of the effort. 7. Use of aids to be independent is allowed. Raquel Bone., Barthel, D.W. (2245). Functional evaluation: the Barthel Index. [address] [facility] (14)2. Rosa Press noa SHELLI Lopez, Valeriano Bell., Rambo Gonzalez., Addison Garibay, [address] ().  Measuring the change indisability after inpatient rehabilitation; comparison of the responsiveness of the Barthel Index and Functional [place] Measure. Journal of Neurology, Neurosurgery, and Psychiatry, 66(4), 342-712. ELIZABETH Farah.A, BERTRAM Maier, & Irvin Tyson M.A. (2004.) Assessment of post-stroke quality of life in cost-effectiveness studies: The usefulness of the Barthel Index and the EuroQoL-5D. [facility], 13, 092-30 Pain: 
Pain Scale 1: Numeric (0 - 10) Pain Intensity 1: 8 Pain Location 1: Back Pain Intervention(s) 1: Medication (see MAR) Activity Tolerance:  
Fair Please refer to the flowsheet for vital signs taken during this treatment. After treatment:  
[] Patient left in no apparent distress sitting up in chair 
[x] Patient left in no apparent distress in bed 
[x] Call bell left within reach [x] Nursing notified 
[] Caregiver present 
[] Bed alarm activated COMMUNICATION/EDUCATION:  
The patients plan of care was discussed with: Physical Therapist and Registered Nurse. [x] Home safety education was provided and the patient/caregiver indicated understanding. [x] Patient/family have participated as able in goal setting and plan of care. [x] Patient/family agree to work toward stated goals and plan of care. [] Patient understands intent and goals of therapy, but is neutral about his/her participation. [] Patient is unable to participate in goal setting and plan of care. This patients plan of care is appropriate for delegation to [facility]. Thank you for this referral. 
Mayra Navarro OT Time Calculation: 30 mins

## 2018-12-31 NOTE — PROGRESS NOTES
Called and spoke to Doctor Stella Goldstein about the last platelets value a couple of days ago. Ordered a stat CBC value for lovenox administration values. Mattie Arreaga reported to me that neurology was consulted the last two day, is aware of consult.

## 2018-12-31 NOTE — PROGRESS NOTES
Problem: Mobility Impaired (Adult and Pediatric) Goal: *Acute Goals and Plan of Care (Insert Text) Physical Therapy Goals Initiated 12/28/2018 1. Patient will move from supine to sit and sit to supine  in bed with minimal assistance/contact guard assist within 7 day(s). 2.  Patient will transfer from bed to chair and chair to bed with minimal assistance using the least restrictive device within 7 day(s). 3.  Patient will perform sit to stand with minimal assistancewithin 7 day(s). 4.  Patient will ambulate with minimal assistancefor 50 feet with the least restrictive device within 7 day(s). physical Therapy TREATMENT Patient: Cortney Nunes (27 y.o. male) Date: 12/31/2018 Diagnosis: Alcohol intoxication delirium with moderate or severe use disorder ([facility]) Alcohol intoxication delirium with moderate or severe use disorder ([facility] [address] Precautions:   
Chart, physical therapy assessment, plan of care and goals were reviewed. ASSESSMENT: 
Patient presents out of bed in chair at start of session. Patient stood with moderate assistance of one and increased time. Patient does not demonstrate such a significant posterior lean as he did last session. Patient ambulated 30 feet with minimal to moderate hand held assist of one. Patient with wide base of support and step to gait pattern. Patient with increased fatigue following distance. Overall improvement in mobility from last session, but continue to recommend inpatient rehab due to impaired balance and gait. Progression toward goals: 
[x]    Improving appropriately and progressing toward goals 
[]    Improving slowly and progressing toward goals 
[]    Not making progress toward goals and plan of care will be adjusted PLAN: 
Patient continues to benefit from skilled intervention to address the above impairments. Continue treatment per established plan of care. Discharge Recommendations:  Inpatient Rehab Further Equipment Recommendations for Discharge:  Patient states he has a straight cane at home SUBJECTIVE:  
Patient stated I'll try.  OBJECTIVE DATA SUMMARY:  
Critical Behavior: 
Neurologic State: Alert Orientation Level: Oriented X4 Cognition: Follows commands Functional Mobility Training: 
Bed Mobility: 
 Patient out of bed in chair at start of session. Transfers: 
Sit to Stand: Moderate assistance;Assist x1;Additional time Stand to Sit: Minimum assistance;Assist x1 Balance: 
Sitting: Impaired Sitting - Static: Good (unsupported) Sitting - Dynamic: Fair (occasional) Standing: Impaired; With support Standing - Static: Fair Standing - Dynamic : Poor Ambulation/Gait Training: 
Distance (ft): 30 Feet (ft) Assistive Device: Gait belt Ambulation - Level of Assistance: Minimal assistance; Moderate assistance;Assist x1;Additional time Gait Abnormalities: Decreased step clearance Base of Support: Widened Speed/Jennifer: Slow Step Length: Left shortened;Right shortened Pain: 
Pain Scale 1: Numeric (0 - 10) Pain Intensity 1: 8 Pain Location 1: Back Pain Intervention(s) 1: Medication (see MAR) Activity Tolerance:  
Fair Please refer to the flowsheet for vital signs taken during this treatment. After treatment:  
[x]    Patient left in no apparent distress sitting up in chair; being transported for testing 
[]    Patient left in no apparent distress in bed 
[x]    Call bell left within reach [x]    Nursing notified 
[]    Caregiver present 
[]    Bed alarm activated COMMUNICATION/COLLABORATION:  
The patients plan of care was discussed with: Registered Nurse Annie Watson, PT Time Calculation: 10 mins

## 2019-01-01 ENCOUNTER — APPOINTMENT (OUTPATIENT)
Dept: MRI IMAGING | Age: 54
DRG: 897 | End: 2019-01-01
Attending: PSYCHIATRY & NEUROLOGY
Payer: MEDICARE

## 2019-01-01 LAB
ALBUMIN SERPL-MCNC: 2.8 G/DL (ref 3.5–5)
ALBUMIN/GLOB SERPL: 0.6 {RATIO} (ref 1.1–2.2)
ALP SERPL-CCNC: 67 U/L (ref 45–117)
ALT SERPL-CCNC: 26 U/L (ref 12–78)
ANION GAP SERPL CALC-SCNC: 9 MMOL/L (ref 5–15)
AST SERPL-CCNC: 49 U/L (ref 15–37)
BILIRUB SERPL-MCNC: 0.4 MG/DL (ref 0.2–1)
BUN SERPL-MCNC: 23 MG/DL (ref 6–20)
BUN/CREAT SERPL: 24 (ref 12–20)
CALCIUM SERPL-MCNC: 9.1 MG/DL (ref 8.5–10.1)
CHLORIDE SERPL-SCNC: 98 MMOL/L (ref 97–108)
CO2 SERPL-SCNC: 26 MMOL/L (ref 21–32)
CREAT SERPL-MCNC: 0.95 MG/DL (ref 0.7–1.3)
GLOBULIN SER CALC-MCNC: 4.4 G/DL (ref 2–4)
GLUCOSE SERPL-MCNC: 98 MG/DL (ref 65–100)
HCYS SERPL-SCNC: 19.3 UMOL/L (ref 3.7–13.9)
MAGNESIUM SERPL-MCNC: 1.8 MG/DL (ref 1.6–2.4)
POTASSIUM SERPL-SCNC: 3.8 MMOL/L (ref 3.5–5.1)
PROT SERPL-MCNC: 7.2 G/DL (ref 6.4–8.2)
SODIUM SERPL-SCNC: 133 MMOL/L (ref 136–145)
VIT B12 SERPL-MCNC: 522 PG/ML (ref 193–986)

## 2019-01-01 PROCEDURE — 74011250636 HC RX REV CODE- 250/636: Performed by: INTERNAL MEDICINE

## 2019-01-01 PROCEDURE — 74011250636 HC RX REV CODE- 250/636: Performed by: NEUROMUSCULOSKELETAL MEDICINE & OMM

## 2019-01-01 PROCEDURE — C9113 INJ PANTOPRAZOLE SODIUM, VIA: HCPCS | Performed by: NEUROMUSCULOSKELETAL MEDICINE & OMM

## 2019-01-01 PROCEDURE — 82607 VITAMIN B-12: CPT

## 2019-01-01 PROCEDURE — A9575 INJ GADOTERATE MEGLUMI 0.1ML: HCPCS | Performed by: NEUROMUSCULOSKELETAL MEDICINE & OMM

## 2019-01-01 PROCEDURE — 80053 COMPREHEN METABOLIC PANEL: CPT

## 2019-01-01 PROCEDURE — 74011250637 HC RX REV CODE- 250/637: Performed by: NEUROMUSCULOSKELETAL MEDICINE & OMM

## 2019-01-01 PROCEDURE — 65660000001 HC RM ICU INTERMED STEPDOWN

## 2019-01-01 PROCEDURE — 83735 ASSAY OF MAGNESIUM: CPT

## 2019-01-01 PROCEDURE — 83090 ASSAY OF HOMOCYSTEINE: CPT

## 2019-01-01 PROCEDURE — 74011250637 HC RX REV CODE- 250/637: Performed by: INTERNAL MEDICINE

## 2019-01-01 PROCEDURE — 36415 COLL VENOUS BLD VENIPUNCTURE: CPT

## 2019-01-01 PROCEDURE — 70553 MRI BRAIN STEM W/O & W/DYE: CPT

## 2019-01-01 PROCEDURE — 74011000250 HC RX REV CODE- 250: Performed by: NEUROMUSCULOSKELETAL MEDICINE & OMM

## 2019-01-01 PROCEDURE — 82652 VIT D 1 25-DIHYDROXY: CPT

## 2019-01-01 RX ORDER — GADOTERATE MEGLUMINE 376.9 MG/ML
16 INJECTION INTRAVENOUS
Status: COMPLETED | OUTPATIENT
Start: 2019-01-01 | End: 2019-01-01

## 2019-01-01 RX ADMIN — ENOXAPARIN SODIUM 40 MG: 40 INJECTION, SOLUTION INTRAVENOUS; SUBCUTANEOUS at 21:20

## 2019-01-01 RX ADMIN — Medication 10 ML: at 21:19

## 2019-01-01 RX ADMIN — THERA TABS 1 TABLET: TAB at 09:50

## 2019-01-01 RX ADMIN — GABAPENTIN 300 MG: 300 CAPSULE ORAL at 21:22

## 2019-01-01 RX ADMIN — Medication 100 MG: at 09:50

## 2019-01-01 RX ADMIN — METOPROLOL TARTRATE 25 MG: 25 TABLET ORAL at 21:20

## 2019-01-01 RX ADMIN — FOLIC ACID 1 MG: 1 TABLET ORAL at 09:50

## 2019-01-01 RX ADMIN — GABAPENTIN 300 MG: 300 CAPSULE ORAL at 18:07

## 2019-01-01 RX ADMIN — Medication 10 ML: at 06:43

## 2019-01-01 RX ADMIN — GABAPENTIN 300 MG: 300 CAPSULE ORAL at 09:50

## 2019-01-01 RX ADMIN — METOPROLOL TARTRATE 25 MG: 25 TABLET ORAL at 09:50

## 2019-01-01 RX ADMIN — SODIUM CHLORIDE 40 MG: 9 INJECTION INTRAMUSCULAR; INTRAVENOUS; SUBCUTANEOUS at 09:50

## 2019-01-01 RX ADMIN — GADOTERATE MEGLUMINE 16 ML: 376.9 INJECTION INTRAVENOUS at 13:40

## 2019-01-01 RX ADMIN — ACETAMINOPHEN 500 MG: 500 TABLET, FILM COATED ORAL at 12:22

## 2019-01-01 NOTE — PROGRESS NOTES
1000 Patient up to chair with two person assist.  
 
1700 Patient up to chair for dinner 
 
1900 Pt returned to bed, seizure pads in place.

## 2019-01-01 NOTE — PROGRESS NOTES
1930: Bedside shift change report given to [facility] (oncoming nurse) by William Robins (offgoing nurse). Report included the following information SBAR, Kardex, ED Summary, Intake/Output, MAR, Recent Results and Cardiac Rhythm ST.  
2139: Spoke with Dr. Rhunette Nissen re: Plt level and Lovenox. New order placed in computer by MD. If Plt level the same or higher than okay to give tonight's dose of lovenox. If Plt level 70-80s then hold tonight's dose. 2223: Plt 101. Lovenox 40mg given. 6423: Bedside shift change report given to Jessica Yu (oncoming nurse) by [facility] (offgoing nurse). Report included the following information SBAR, Kardex, ED Summary, Intake/Output, MAR, Recent Results and Cardiac Rhythm SR, ST.

## 2019-01-01 NOTE — PROGRESS NOTES
Initial Nutrition Assessment: 
 
INTERVENTIONS/RECOMMENDATIONS:  
·  mech soft diet as tolerated ·  cont thiamine and folate, CIWA protocol ·  cont to replete magnesium ·  push fluids · ASSESSMENT:  
Pt admitted with ETOH intoxication, nontraumatic rhabdo, lactic acidosis/starvation ketosis, hx notable for ETOH abuse and hepatitis. CIWA protocol in place, pt receiving thiamine and folate supplementation. Tolerating diet, reported problems swallowing earlier this week. Magnesium normal this morning. Diet Order: Mechanical soft 
% Eaten:   
Patient Vitals for the past 72 hrs: 
 % Diet Eaten 12/31/18 1828 75 % 12/31/18 1421 75 % 12/31/18 0830 75 % Pertinent Medications: [x]Reviewed []Other Pertinent Labs: [x]Reviewed []Other Food Allergies: [x]None []Other Last BM:    [x]Active     []Hyperactive  []Hypoactive       [] Absent BS Skin:    [x] Intact   [] Incision  [] Breakdown  [] Other: Anthropometrics:  
Height: 5' 9\" (175.3 cm) Weight: 78 kg (172 lb) IBW (%IBW): 72.6 kg (160 lb) (107.5 %) UBW (%UBW):   (  %) Last Weight Metrics: 
Weight Loss Metrics 1/1/2019 3/6/2014 Today's Wt 172 lb 210 lb BMI 25.4 kg/m2 34.95 kg/m2 BMI: Body mass index is 25.4 kg/m². This BMI is indicative of: 
 []Underweight    [x]Normal    []Overweight    [] Obesity   [] Extreme Obesity (BMI>40) Estimated Nutrition Needs (Based on):  
2150 Kcals/day(1808 x 1.3 AF) , 80 g(~ 1g/kg) Protein Carbohydrate: At Least 130 g/day  Fluids: 1800 mL/day (CKD) NUTRITION DIAGNOSES:  
Problem:  Limited adherence to nutrition-related recommendations Etiology: related to poor diet, poor nutrition choices, ETOH abuse, electrolyte derangements, ketosis Signs/Symptoms: as evidenced by Labs, acidosis, UDS NUTRITION INTERVENTIONS: cont mech soft diet with supplements GOAL:  
PO intake > 75% most meals at follow up -7 days LEARNING NEEDS (Diet, Food/Nutrient-Drug Interaction):  
 [x] None Identified 
 [] Identified and Education Provided/Documented 
 [] Identified and Pt declined/was not appropriate Cultureal, Adventist, OR Ethnic Dietary Needs:  
 [x] None Identified 
 [] Identified and Addressed 
 
 [x] Interdisciplinary Care Plan Reviewed/Documented  
 [x] Discharge Planning:   [facility] soft diet with supplements MONITORING Cher Edwardsa Comes Outcomes: Readiness to change, Food/nutrition knowledge Food/Nutrient Intake Outcomes: Total energy intake, Oral fluids amount Physical Signs/Symptoms Outcomes: Acid-base balance, Electrolyte and renal profile, Glucose profile NUTRITION RISK:  
 [x] Patient At Nutritional Risk Mild 
 [] Patient Not At Nutritional Risk PT SEEN FOR:  
 []  MD Consult: []Calorie Count []Diabetic Diet Education []Diet Education []Electrolyte Management 
   [x]General Nutrition Management and Supplements []Management of Tube Feeding []TPN Recommendations []  RN Referral:  []MST score >=2 
   []Enteral/Parenteral Nutrition PTA []Pregnant: Gestational DM or Multigestation 
   []Pressure Ulcer/Wound Care needs 
     
[]  Low BMI 
[]  JONE Pate, PhD, [address] [place] [address], [address] [place] [address]

## 2019-01-01 NOTE — PROGRESS NOTES
Bedside shift change report given to Darroll Cockayne (oncoming nurse) by Daria Lu (offgoing nurse). Report included the following information SBAR, Kardex, Intake/Output and MAR.

## 2019-01-01 NOTE — H&P
Hospitalist Progress Note Subjective:  
Daily Progress Note: 2019 10:42 AM 
 
Remains mildly lethargic. No hallucinations. Current Facility-Administered Medications Medication Dose Route Frequency  aluminum-magnesium hydroxide (MAALOX) oral suspension 15 mL  15 mL Oral QID PRN  
 metoprolol tartrate (LOPRESSOR) tablet 25 mg  25 mg Oral Q12H  pantoprazole (PROTONIX) 40 mg in sodium chloride 0.9% 10 mL injection  40 mg IntraVENous DAILY  acetaminophen (TYLENOL) tablet 500 mg  500 mg Oral Q6H PRN  
 LORazepam (ATIVAN) injection 2 mg  2 mg IntraVENous Q1H PRN  
 LORazepam (ATIVAN) injection 4 mg  4 mg IntraVENous Q1H PRN  
 sodium chloride (NS) flush 5-10 mL  5-10 mL IntraVENous Q8H  
 sodium chloride (NS) flush 5-10 mL  5-10 mL IntraVENous PRN  
 enoxaparin (LOVENOX) injection 40 mg  40 mg SubCUTAneous Q24H  
 gabapentin (NEURONTIN) capsule 300 mg  300 mg Oral TID  thiamine HCL (B-1) tablet 100 mg  100 mg Oral DAILY  therapeutic multivitamin (THERAGRAN) tablet 1 Tab  1 Tab Oral DAILY  folic acid (FOLVITE) tablet 1 mg  1 mg Oral DAILY Review of Systems A comprehensive review of systems was negative except for that written in the HPI. Objective:  
 
Visit Vitals /85 Pulse 91 Temp 99.2 °F (37.3 °C) Resp 11 Ht 5' 9\" (1.753 m) Wt 78 kg (172 lb) SpO2 100% BMI 25.40 kg/m² O2 Flow Rate (L/min): 2 l/min O2 Device: Room air Temp (24hrs), Av.9 °F (37.2 °C), Min:98.6 °F (37 °C), Max:99.2 °F (37.3 °C) No intake/output data recorded.  1901 -  0700 In: 840 [P.O.:840] Out: 2550 [Urine:2550] Visit Vitals /85 Pulse 91 Temp 99.2 °F (37.3 °C) Resp 11 Ht 5' 9\" (1.753 m) Wt 78 kg (172 lb) SpO2 100% BMI 25.40 kg/m² General:  Alert, cooperative, no distress, appears stated age. Head:  Normocephalic, without obvious abnormality, atraumatic. Eyes:  Conjunctivae/corneas clear. PERRL, EOMs intact. Fundi benign Ears:  Normal TMs and external ear canals both ears. Nose: Nares normal. Septum midline. Mucosa normal. No drainage or sinus tenderness. Throat: Lips, mucosa, and tongue normal. Teeth and gums normal.  
Neck: Supple, symmetrical, trachea midline, no adenopathy, thyroid: no enlargement/tenderness/nodules, no carotid bruit and no JVD. Back:   Symmetric, no curvature. ROM normal. No CVA tenderness. Lungs:   Clear to auscultation bilaterally. Chest wall:  No tenderness or deformity. Heart:  Regular rate and rhythm, S1, S2 normal, no murmur, click, rub or gallop. Abdomen:   Soft, non-tender. Bowel sounds normal. No masses,  No organomegaly. Genitalia:  Normal male without lesion, discharge or tenderness. Rectal:  Normal tone, normal prostate, no masses or tenderness Guaiac negative stool. Extremities: Extremities normal, atraumatic, no cyanosis or edema. Pulses: 2+ and symmetric all extremities. Skin: Skin color, texture, turgor normal. No rashes or lesions Lymph nodes: Cervical, supraclavicular, and axillary nodes normal.  
Neurologic: CNII-XII intact. Normal strength, sensation and reflexes throughout. Additional comments:I reviewed the patient's new clinical lab test results. [facility] Data Review Recent Results (from the past 24 hour(s)) CK Collection Time: 12/31/18  3:38 PM  
Result Value Ref Range  39 - 308 U/L  
HEPATIC FUNCTION PANEL Collection Time: 12/31/18  3:38 PM  
Result Value Ref Range Protein, total 7.2 6.4 - 8.2 g/dL Albumin 2.8 (L) 3.5 - 5.0 g/dL Globulin 4.4 (H) 2.0 - 4.0 g/dL A-G Ratio 0.6 (L) 1.1 - 2.2 Bilirubin, total 0.3 0.2 - 1.0 MG/DL Bilirubin, direct 0.2 0.0 - 0.2 MG/DL Alk. phosphatase 63 45 - 117 U/L  
 AST (SGOT) 55 (H) 15 - 37 U/L  
 ALT (SGPT) 26 12 - 78 U/L  
PLATELET COUNT Collection Time: 12/31/18  9:52 PM  
Result Value Ref Range PLATELET 881 (L) 300 - 400 K/uL METABOLIC PANEL, COMPREHENSIVE  
 Collection Time: 01/01/19  3:54 AM  
Result Value Ref Range Sodium 133 (L) 136 - 145 mmol/L Potassium 3.8 3.5 - 5.1 mmol/L Chloride 98 97 - 108 mmol/L  
 CO2 26 21 - 32 mmol/L Anion gap 9 5 - 15 mmol/L Glucose 98 65 - 100 mg/dL BUN 23 (H) 6 - 20 MG/DL Creatinine 0.95 0.70 - 1.30 MG/DL  
 BUN/Creatinine ratio 24 (H) 12 - 20 GFR est AA >60 >60 ml/min/1.73m2 GFR est non-AA >60 >60 ml/min/1.73m2 Calcium 9.1 8.5 - 10.1 MG/DL Bilirubin, total 0.4 0.2 - 1.0 MG/DL  
 ALT (SGPT) 26 12 - 78 U/L  
 AST (SGOT) 49 (H) 15 - 37 U/L Alk. phosphatase 67 45 - 117 U/L Protein, total 7.2 6.4 - 8.2 g/dL Albumin 2.8 (L) 3.5 - 5.0 g/dL Globulin 4.4 (H) 2.0 - 4.0 g/dL A-G Ratio 0.6 (L) 1.1 - 2.2 MAGNESIUM Collection Time: 01/01/19  3:54 AM  
Result Value Ref Range Magnesium 1.8 1.6 - 2.4 mg/dL VITAMIN B12 Collection Time: 01/01/19  3:54 AM  
Result Value Ref Range Vitamin B12 522 193 - 986 pg/mL HOMOCYSTEINE, PLASMA Collection Time: 01/01/19  3:54 AM  
Result Value Ref Range Homocysteine, plasma 19.3 (H) 3.7 - 13.9 umol/L Assessment/Plan:  
 
Principal Problem: 
  Alcohol intoxication delirium with moderate or severe use disorder ([facility] (12/27/2018) Active Problems: 
  Syncope (12/27/2018) Stage 3 chronic kidney disease ([facility] [address] (12/27/2018) Alcoholic hepatitis (65/77/6291) Non-traumatic rhabdomyolysis (12/27/2018) Electrolyte abnormality with severy hypomagnesemia Resolved after magnesium replacement. 
  
Alcohol intoxication delirium with moderate or severe use disorder:POA Alcohol hepatitis CIWA protocol, MVI, thiamin an dfolate. Risk for alcohol withdrawal seizures PRN Ativan (no ativan in last 24 hrs.) Dc phenobarb 
  
presbyesophagus - no evidence of achalasia,  Cont ppi and upright with eating. No gi here, may need to defer egd to op. Syncopal episode Multiple falls per family Mild left nasolabial droop, unclear whether this is patient's baseline or something new Head CT- unremarkable Monitor on telemetry Neuro consulted. Defer MRI/MRA to neurology 
  
Lactic acidosis/starvation ketosis LA 4.3,c/w ivf Recheck LA Nontraumatic rhabdomyolysis Ck stable, crt stable. Marijuana abuse UDS positive for THC  
  
lvh - on bblocker. For tachycardia. bp stable. Body mass index is 25.4 kg/m². Code Status: full Surrogate Decision Maker:niece 
  
DVT Prophylaxis: Lovenox GI Prophylaxis: not indicated 
  
  
 
 
 
 
Care Plan discussed with: Patient/Family Total time spent with patient/care team: 20 minutes. Signed By: Alma Lenz DO   
 January 1, 2019

## 2019-01-02 LAB — RPR SER QL: NONREACTIVE

## 2019-01-02 PROCEDURE — 74011250636 HC RX REV CODE- 250/636: Performed by: INTERNAL MEDICINE

## 2019-01-02 PROCEDURE — 65660000001 HC RM ICU INTERMED STEPDOWN

## 2019-01-02 PROCEDURE — 97116 GAIT TRAINING THERAPY: CPT | Performed by: PHYSICAL THERAPIST

## 2019-01-02 PROCEDURE — 74011250637 HC RX REV CODE- 250/637: Performed by: NEUROMUSCULOSKELETAL MEDICINE & OMM

## 2019-01-02 PROCEDURE — 97535 SELF CARE MNGMENT TRAINING: CPT | Performed by: OCCUPATIONAL THERAPIST

## 2019-01-02 PROCEDURE — 74011250637 HC RX REV CODE- 250/637: Performed by: INTERNAL MEDICINE

## 2019-01-02 PROCEDURE — 65270000029 HC RM PRIVATE

## 2019-01-02 RX ORDER — PANTOPRAZOLE SODIUM 40 MG/1
40 TABLET, DELAYED RELEASE ORAL
Status: DISCONTINUED | OUTPATIENT
Start: 2019-01-02 | End: 2019-01-04 | Stop reason: HOSPADM

## 2019-01-02 RX ADMIN — GABAPENTIN 300 MG: 300 CAPSULE ORAL at 11:58

## 2019-01-02 RX ADMIN — GABAPENTIN 300 MG: 300 CAPSULE ORAL at 21:06

## 2019-01-02 RX ADMIN — Medication 10 ML: at 14:00

## 2019-01-02 RX ADMIN — Medication 10 ML: at 21:01

## 2019-01-02 RX ADMIN — Medication 10 ML: at 05:56

## 2019-01-02 RX ADMIN — Medication 100 MG: at 11:58

## 2019-01-02 RX ADMIN — METOPROLOL TARTRATE 25 MG: 25 TABLET ORAL at 21:06

## 2019-01-02 RX ADMIN — THERA TABS 1 TABLET: TAB at 11:58

## 2019-01-02 RX ADMIN — ENOXAPARIN SODIUM 40 MG: 40 INJECTION, SOLUTION INTRAVENOUS; SUBCUTANEOUS at 21:06

## 2019-01-02 RX ADMIN — GABAPENTIN 300 MG: 300 CAPSULE ORAL at 19:14

## 2019-01-02 RX ADMIN — PANTOPRAZOLE SODIUM 40 MG: 40 TABLET, DELAYED RELEASE ORAL at 11:58

## 2019-01-02 RX ADMIN — METOPROLOL TARTRATE 25 MG: 25 TABLET ORAL at 11:58

## 2019-01-02 RX ADMIN — FOLIC ACID 1 MG: 1 TABLET ORAL at 11:58

## 2019-01-02 NOTE — PROGRESS NOTES
Hospitalist Progress Note Subjective:  
Daily Progress Note: 2019 10:49 AM 
 
No hallucinations or agiation. Tolerating diet well. Current Facility-Administered Medications Medication Dose Route Frequency  aluminum-magnesium hydroxide (MAALOX) oral suspension 15 mL  15 mL Oral QID PRN  
 metoprolol tartrate (LOPRESSOR) tablet 25 mg  25 mg Oral Q12H  pantoprazole (PROTONIX) 40 mg in sodium chloride 0.9% 10 mL injection  40 mg IntraVENous DAILY  acetaminophen (TYLENOL) tablet 500 mg  500 mg Oral Q6H PRN  
 LORazepam (ATIVAN) injection 2 mg  2 mg IntraVENous Q1H PRN  
 LORazepam (ATIVAN) injection 4 mg  4 mg IntraVENous Q1H PRN  
 sodium chloride (NS) flush 5-10 mL  5-10 mL IntraVENous Q8H  
 sodium chloride (NS) flush 5-10 mL  5-10 mL IntraVENous PRN  
 enoxaparin (LOVENOX) injection 40 mg  40 mg SubCUTAneous Q24H  
 gabapentin (NEURONTIN) capsule 300 mg  300 mg Oral TID  thiamine HCL (B-1) tablet 100 mg  100 mg Oral DAILY  therapeutic multivitamin (THERAGRAN) tablet 1 Tab  1 Tab Oral DAILY  folic acid (FOLVITE) tablet 1 mg  1 mg Oral DAILY Review of Systems A comprehensive review of systems was negative except for that written in the HPI. Objective:  
 
Visit Vitals /90 (BP 1 Location: Left arm, BP Patient Position: Supine; At rest) Pulse 94 Temp 99 °F (37.2 °C) Resp 25 Ht 5' 9\" (1.753 m) Wt 78 kg (172 lb) SpO2 94% BMI 25.40 kg/m² O2 Flow Rate (L/min): 2 l/min O2 Device: Room air Temp (24hrs), Av.3 °F (37.4 °C), Min:98.8 °F (37.1 °C), Max:100 °F (37.8 °C) No intake/output data recorded.  1901 -  0700 In: 480 [P.O.:480] Out: 1636 [[id number]] Visit Vitals /90 (BP 1 Location: Left arm, BP Patient Position: Supine; At rest) Pulse 94 Temp 99 °F (37.2 °C) Resp 25 Ht 5' 9\" (1.753 m) Wt 78 kg (172 lb) SpO2 94% BMI 25.40 kg/m² General:  Alert, cooperative, no distress, appears stated age. Head:  Normocephalic, without obvious abnormality, atraumatic. Eyes:  Conjunctivae/corneas clear. PERRL, EOMs intact. Fundi benign Ears:  Normal TMs and external ear canals both ears. Nose: Nares normal. Septum midline. Mucosa normal. No drainage or sinus tenderness. Throat: Lips, mucosa, and tongue normal. Teeth and gums normal.  
Neck: Supple, symmetrical, trachea midline, no adenopathy, thyroid: no enlargement/tenderness/nodules, no carotid bruit and no JVD. Back:   Symmetric, no curvature. ROM normal. No CVA tenderness. Lungs:   Clear to auscultation bilaterally. Chest wall:  No tenderness or deformity. Heart:  Regular rate and rhythm, S1, S2 normal, no murmur, click, rub or gallop. Abdomen:   Soft, non-tender. Bowel sounds normal. No masses,  No organomegaly. Extremities: Extremities normal, atraumatic, no cyanosis or edema. Pulses: 2+ and symmetric all extremities. Skin: Skin color, texture, turgor normal. No rashes or lesions Lymph nodes: Cervical, supraclavicular, and axillary nodes normal.  
Neurologic: CNII-XII intact. Normal strength, sensation and reflexes throughout. Additional comments:None Data Review No results found for this or any previous visit (from the past 24 hour(s)). Assessment/Plan:  
 
Principal Problem: 
  Alcohol intoxication delirium with moderate or severe use disorder ([facility] [address] (12/27/2018) Active Problems: 
  Syncope (12/27/2018) Stage 3 chronic kidney disease ([address] (12/27/2018) Alcoholic hepatitis (66/65/1593) Non-traumatic rhabdomyolysis (12/27/2018) 
 
abnormal mri Electrolyte abnormality with severy hypomagnesemia Resolved Alcohol intoxication delirium with moderate or severe use disorder:POA Alcohol hepatitis CIWA protocol, MVI, thiamin and folate. Risk for alcohol withdrawal seizures PRN Ativan (no ativan in last 24 hrs.) Dc phenobarb 
  
 presbyesophagus - no evidence of achalasia,  Cont ppi and upright with eating. No gi here, may need to defer egd to op. Syncopal episode Multiple falls per family Mild left nasolabial droop, unclear whether this is patient's baseline or something new Head CT- unremarkable Monitor on telemetry Mri reviewed, will await neuro input. Can downgrade to [facility]. Will need snf rehab. 
  
Lactic acidosis/starvation ketosis LA 4.3,c/w ivf Recheck LA Nontraumatic rhabdomyolysis Ck stable, crt stable. Marijuana abuse UDS positive for THC  
  
lvh - on bblocker hr improved. Body mass index is 25.4 kg/m². Code Status: full Surrogate Decision Maker:niece 
  
DVT Prophylaxis: Lovenox GI Prophylaxis: not indicated 
  
 
 
 
 
Care Plan discussed with: Patient/Family Total time spent with patient/care team: 30 minutes. Signed By: Suzanne Carpio DO   
 January 2, 2019

## 2019-01-02 NOTE — PROGRESS NOTES
Bedside verbal report received from previous RN, Selma Escobedo. Pt resting in bed, watching TV. Call bell remains next to pt.

## 2019-01-02 NOTE — PROGRESS NOTES
Problem: Mobility Impaired (Adult and Pediatric) Goal: *Acute Goals and Plan of Care (Insert Text) Physical Therapy Goals Initiated 12/28/2018 1. Patient will move from supine to sit and sit to supine  in bed with minimal assistance/contact guard assist within 7 day(s). 2.  Patient will transfer from bed to chair and chair to bed with minimal assistance using the least restrictive device within 7 day(s). 3.  Patient will perform sit to stand with minimal assistancewithin 7 day(s). 4.  Patient will ambulate with minimal assistancefor 50 feet with the least restrictive device within 7 day(s). physical Therapy TREATMENT Patient: Patti Graham (53 y.o. male) Date: 1/2/2019 Diagnosis: Alcohol intoxication delirium with moderate or severe use disorder ([facility]) Alcohol intoxication delirium with moderate or severe use disorder ([facility] [address] Precautions: Fall Chart, physical therapy assessment, plan of care and goals were reviewed. ASSESSMENT: 
Patient presents supine in bed. Patient performed bed mobility and scooting with stand-by assistance. Patient stood with contact guard to minimal assistance. Patient with improved sitting balance today. Patient ambulated 150 feet with contact guard to minimal assistance of one. Patient with decreased pace but improved balance when compared to last session. Patient's resting HR at 100 bpm, and increased to 105 bpm with activity. Patient's SpO2 at 100% on room air after ambulation. Based on improvement with mobility, patient may benefit from home health at discharge. Progression toward goals: 
[x]    Improving appropriately and progressing toward goals 
[]    Improving slowly and progressing toward goals 
[]    Not making progress toward goals and plan of care will be adjusted PLAN: 
Patient continues to benefit from skilled intervention to address the above impairments. Continue treatment per established plan of care. Discharge Recommendations:  Home Health Further Equipment Recommendations for Discharge:  Patient reports that he has straight cane SUBJECTIVE:  
Patient stated I'm feeling better.  OBJECTIVE DATA SUMMARY:  
Critical Behavior: 
Neurologic State: Alert Orientation Level: Oriented X4 Cognition: Follows commands Safety/Judgement: Awareness of environment, Decreased awareness of need for safety, Fall prevention, Insight into deficits Functional Mobility Training: 
Bed Mobility: 
Supine to Sit: Stand-by assistance Scooting: Stand-by assistance Transfers: 
Sit to Stand: Contact guard assistance;Minimum assistance;Assist x1 Stand to Sit: Contact guard assistance Balance: 
Sitting: Intact Sitting - Static: Good (unsupported) Sitting - Dynamic: Good (unsupported) Standing: Impaired; With support Standing - Static: Good Standing - Dynamic : Fair Ambulation/Gait Training: 
Distance (ft): 150 Feet (ft) Assistive Device: Gait belt Ambulation - Level of Assistance: Contact guard assistance;Minimal assistance;Assist x1;Additional time Gait Abnormalities: Decreased step clearance Speed/Jennifer: Pace decreased (<100 feet/min) Step Length: Left shortened;Right shortened Pain: 
Pain Scale 1: Numeric (0 - 10) Pain Intensity 1: 0 Activity Tolerance:  
Fair Please refer to the flowsheet for vital signs taken during this treatment. After treatment:  
[x]    Patient left in no apparent distress sitting up in chair 
[]    Patient left in no apparent distress in bed 
[x]    Call bell left within reach [x]    Nursing notified 
[]    Caregiver present 
[]    Bed alarm activated COMMUNICATION/COLLABORATION:  
The patients plan of care was discussed with: Registered Nurse Singh Watts, PT Time Calculation: 16 mins

## 2019-01-02 NOTE — PROGRESS NOTES
Problem: Self Care Deficits Care Plan (Adult) Goal: *Acute Goals and Plan of Care (Insert Text) Occupational Therapy Goals Initiated 12/31/2018 1. Patient will perform grooming and bathing standing at sink with contact guard assist within 7 day(s). 2.  Patient will perform lower body dressing with minimal assistance/contact guard assist within 7 day(s). 3.  Patient will perform toilet transfers with minimal assistance/contact guard assist using best DME for safety within 7 day(s). 4.  Patient will perform all aspects of toileting with minimal assistance/contact guard assist within 7 day(s). 5.  Patient will participate in upper extremity therapeutic exercise/activities with independence for 10 minutes within 7 day(s). 6.  Patient will utilize energy conservation techniques during functional activities with verbal and visual cues within 7 day(s). Occupational Therapy TREATMENT Patient: Laine Mckeon (63 y.o. male) Date: 1/2/2019 Diagnosis: Alcohol intoxication delirium with moderate or severe use disorder ([facility]) Alcohol intoxication delirium with moderate or severe use disorder ([facility] [address] Precautions: Fall Chart, occupational therapy assessment, plan of care, and goals were reviewed. ASSESSMENT: 
Pt much improved from previous session earlier this week. He was able to perform sit to stand with CGA, amb in room and bathroom with CGA using RW, perform LE dressing tasks with CGA and now with no spillage noted during self feeding tasks. Recommend HH therapy at discharge. Progression toward goals: 
[x]       Improving appropriately and progressing toward goals 
[]       Improving slowly and progressing toward goals 
[]       Not making progress toward goals and plan of care will be adjusted PLAN: 
Patient continues to benefit from skilled intervention to address the above impairments. Continue treatment per established plan of care. Discharge Recommendations:  Home Health Further Equipment Recommendations for Discharge:  TBD SUBJECTIVE:  
Patient stated I feel better.  OBJECTIVE DATA SUMMARY:  
Cognitive/Behavioral Status: 
Neurologic State: Alert Orientation Level: Oriented X4 Cognition: Appropriate for age attention/concentration; Follows commands Perception: Appears intact Perseveration: No perseveration noted Safety/Judgement: Awareness of environment; Fall prevention; Insight into deficits Functional Mobility and Transfers for ADLs:Bed Mobility: 
Supine to Sit: Stand-by assistance Scooting: Stand-by assistance Transfers: 
Sit to Stand: Contact guard assistance; Additional time;Assist x1(A for forward wt shift) Functional Transfers Bathroom Mobility: Contact guard assistance Toilet Transfer : Contact guard assistance Cues: Tactile cues provided;Verbal cues provided;Visual cues provided Adaptive Equipment: Walker (comment);Grab bars Balance: 
Sitting: Intact Sitting - Static: Good (unsupported) Sitting - Dynamic: Good (unsupported) Standing: Impaired; With support Standing - Static: Fair Standing - Dynamic : Fair ADL Intervention: 
Grooming Grooming Assistance: Contact guard assistance(standing at sink) Washing Hands: Contact guard assistance Cues: Tactile cues provided;Verbal cues provided;Visual cues provided Lower Body Dressing Assistance Dressing Assistance: Contact guard assistance(for safety with standing balance) Underpants: Contact guard assistance Socks: Supervision/set-up Leg Crossed Method Used: No 
Position Performed: Bending forward method;Seated in chair Cues: Don;Doff;Tactile cues provided;Verbal cues provided;Visual cues provided Toileting Toileting Assistance: Contact guard assistance Bladder Hygiene: Supervision/set-up Bowel Hygiene: Supervision/set-up Clothing Management: Contact guard assistance Cues: Tactile cues provided;Verbal cues provided;Visual cues provided Adaptive Equipment: Grab bars Cognitive Retraining Safety/Judgement: Awareness of environment; Fall prevention; Insight into deficits Pain: 
Pain Scale 1: Numeric (0 - 10) Pain Intensity 1: 0 Activity Tolerance:  
Fair Please refer to the flowsheet for vital signs taken during this treatment. After treatment:  
[x] Patient left in no apparent distress sitting up in chair 
[] Patient left in no apparent distress in bed 
[x] Call bell left within reach [x] Nursing notified 
[] Caregiver present 
[] Bed alarm activated COMMUNICATION/COLLABORATION:  
The patients plan of care was discussed with: Registered Nurse Jorge Hartmann OT Time Calculation: 13 mins

## 2019-01-02 NOTE — CONSULTS
NEUROLOGY CONSULTATION    DATE OF CONSULTATION: 12/31/2018  CONSULTED BY:Dr Haley Navarrete      HISTORY OF PRESENT ILLNESS  Staci Azevedo is a 52 y.o. Right handed black  male with history of Etoh abuse, Asthma, HTN, who was admitted for syncope, frequent fall. Patient is a poor historian, most of the history obtained from the chart,        [facility]  Past Medical History:   Diagnosis Date    Alcohol abuse     Asthma     Hypertension        SH  Social History     Socioeconomic History    Marital status: SINGLE     Spouse name: Not on file    Number of children: Not on file    Years of education: Not on file    Highest education level: Not on file   Tobacco Use    Smoking status: Current Every Day Smoker    Smokeless tobacco: Never Used   Substance and Sexual Activity    Alcohol use: Yes    Drug use: No       FH  History reviewed. No pertinent family history.     ALLERGIES  Allergies   Allergen Reactions    Aspirin Nausea and Vomiting       CURRENT MEDS  Current Facility-Administered Medications   Medication Dose Route Frequency Provider Last Rate Last Dose    aluminum-magnesium hydroxide (MAALOX) oral suspension 15 mL  15 mL Oral QID PRN Hima Aguilar B, DO        metoprolol tartrate (LOPRESSOR) tablet 25 mg  25 mg Oral Q12H Hima Aguilar, DO   25 mg at 01/01/19 2120    pantoprazole (PROTONIX) 40 mg in sodium chloride 0.9% 10 mL injection  40 mg IntraVENous DAILY Hima Aguilar, DO   40 mg at 01/01/19 0950    acetaminophen (TYLENOL) tablet 500 mg  500 mg Oral Q6H PRN Hima Aguilar, DO   500 mg at 01/01/19 1222    LORazepam (ATIVAN) injection 2 mg  2 mg IntraVENous Q1H PRN Moise Lenz MD   2 mg at 12/31/18 1739    LORazepam (ATIVAN) injection 4 mg  4 mg IntraVENous Q1H PRN Moise Lenz MD   4 mg at 12/29/18 1000    sodium chloride (NS) flush 5-10 mL  5-10 mL IntraVENous Q8H Moise Lenz MD   10 mL at 01/02/19 0556    sodium chloride (NS) flush 5-10 mL  5-10 mL IntraVENous PRN Adela Begum MD        enoxaparin (LOVENOX) injection 40 mg  40 mg SubCUTAneous Q24H Adela Begum MD   40 mg at 01/01/19 2120    gabapentin (NEURONTIN) capsule 300 mg  300 mg Oral TID Adela Begum MD   300 mg at 01/01/19 2122    thiamine HCL (B-1) tablet 100 mg  100 mg Oral DAILY Adela Begum MD   100 mg at 01/01/19 0950    therapeutic multivitamin (THERAGRAN) tablet 1 Tab  1 Tab Oral DAILY Adela Begum MD   1 Tab at 09/80/74 9795    folic acid (FOLVITE) tablet 1 mg  1 mg Oral DAILY Adela Begum MD   1 mg at 01/01/19 0950       ROS  Constitutional: Denies recent weight change, fever, chills, sweats   ENT/Mouth:  Eye:  Denies hearing loss, ringing in the ears,   Denies vision changes,   Cardiovascular:  Respiratory:   Denies chest pain/angina pectoris  Denies shortness of breath, cough, wheezing   Gastrointestinal: Denies nausea, vomiting, constipation, frequent diarrhea,   Musculoskeletal:   Denies joint pain, stiffness/swelling   Integument:   Denies rash/itching   Neurological:  Per HPI   Psychiatric:   Denies anxiety or depression       CLINICAL DATA REVIEW  IMAGING: (I personally reviewed these images in PACS and this is my impression)  CAROTIDS  ECHO  TELEMETRY      LABS  Results for orders placed or performed during the hospital encounter of 12/27/18   CULTURE, MRSA   Result Value Ref Range    Special Requests: NO SPECIAL REQUESTS      Culture result: MRSA NOT PRESENT      Culture result:            Screening of patient nares for MRSA is for surveillance purposes and, if positive, to facilitate isolation considerations in high risk settings. It is not intended for automatic decolonization interventions per se as regimens are not sufficiently effective to warrant routine use.    CBC WITH AUTOMATED DIFF   Result Value Ref Range    WBC 2.8 (L) 4.1 - 11.1 K/uL    RBC 3.85 (L) 4.10 - 5.70 M/uL    HGB 11.5 (L) 12.1 - 17.0 g/dL    HCT 34.4 (L) 36.6 - 50.3 % MCV 89.4 80.0 - 99.0 FL    MCH 29.9 26.0 - 34.0 PG    MCHC 33.4 30.0 - 36.5 g/dL    RDW 15.4 (H) 11.5 - 14.5 %    PLATELET 015 904 - 877 K/uL    NRBC 0.0 0  WBC    ABSOLUTE NRBC 0.00 0.00 - 0.01 K/uL    NEUTROPHILS 42 32 - 75 %    LYMPHOCYTES 44 12 - 49 %    MONOCYTES 13 5 - 13 %    EOSINOPHILS 0 0 - 7 %    BASOPHILS 1 0 - 1 %    IMMATURE GRANULOCYTES 0 0.0 - 0.5 %    ABS. NEUTROPHILS 1.2 (L) 1.8 - 8.0 K/UL    ABS. LYMPHOCYTES 1.2 0.8 - 3.5 K/UL    ABS. MONOCYTES 0.4 0.0 - 1.0 K/UL    ABS. EOSINOPHILS 0.0 0.0 - 0.4 K/UL    ABS. BASOPHILS 0.0 0.0 - 0.1 K/UL    ABS. IMM. GRANS. 0.0 0.00 - 0.04 K/UL    DF AUTOMATED     METABOLIC PANEL, COMPREHENSIVE   Result Value Ref Range    Sodium 136 136 - 145 mmol/L    Potassium 4.0 3.5 - 5.1 mmol/L    Chloride 96 (L) 97 - 108 mmol/L    CO2 27 21 - 32 mmol/L    Anion gap 13 5 - 15 mmol/L    Glucose 79 65 - 100 mg/dL    BUN 14 6 - 20 MG/DL    Creatinine 1.30 0.70 - 1.30 MG/DL    BUN/Creatinine ratio 11 (L) 12 - 20      GFR est AA >60 >60 ml/min/1.73m2    GFR est non-AA 57 (L) >60 ml/min/1.73m2    Calcium 8.8 8.5 - 10.1 MG/DL    Bilirubin, total 0.4 0.2 - 1.0 MG/DL    ALT (SGPT) 46 12 - 78 U/L    AST (SGOT) 139 (H) 15 - 37 U/L    Alk.  phosphatase 76 45 - 117 U/L    Protein, total 8.7 (H) 6.4 - 8.2 g/dL    Albumin 3.7 3.5 - 5.0 g/dL    Globulin 5.0 (H) 2.0 - 4.0 g/dL    A-G Ratio 0.7 (L) 1.1 - 2.2     LIPASE   Result Value Ref Range    Lipase 122 73 - 393 U/L   ETHYL ALCOHOL   Result Value Ref Range    ALCOHOL(ETHYL),SERUM 435 (H) <10 MG/DL   DRUG SCREEN, URINE   Result Value Ref Range    AMPHETAMINES NEGATIVE  NEG      BARBITURATES NEGATIVE  NEG      BENZODIAZEPINES NEGATIVE  NEG      COCAINE NEGATIVE  NEG      METHADONE NEGATIVE  NEG      OPIATES NEGATIVE  NEG      PCP(PHENCYCLIDINE) NEGATIVE  NEG      THC (TH-CANNABINOL) POSITIVE (A) NEG      Drug screen comment (NOTE)    URINALYSIS W/ REFLEX CULTURE   Result Value Ref Range    Color YELLOW/STRAW      Appearance CLEAR CLEAR Specific gravity 1.010 1.003 - 1.030      pH (UA) 5.0 5.0 - 8.0      Protein 100 (A) NEG mg/dL    Glucose NEGATIVE  NEG mg/dL    Ketone NEGATIVE  NEG mg/dL    Bilirubin NEGATIVE  NEG      Blood TRACE (A) NEG      Urobilinogen 0.2 0.2 - 1.0 EU/dL    Nitrites NEGATIVE  NEG      Leukocyte Esterase NEGATIVE  NEG      WBC 0-4 0 - 4 /hpf    RBC 0-5 0 - 5 /hpf    Epithelial cells FEW FEW /lpf    Bacteria NEGATIVE  NEG /hpf    UA:UC IF INDICATED CULTURE NOT INDICATED BY UA RESULT CNI     TROPONIN I   Result Value Ref Range    Troponin-I, Qt. <0.05 <0.05 ng/mL   LACTIC ACID   Result Value Ref Range    Lactic acid 4.2 (HH) 0.4 - 2.0 MMOL/L   CK W/ REFLX CKMB   Result Value Ref Range    CK 1,273 (H) 39 - 308 U/L   CK-MB,QUANT.    Result Value Ref Range    CK - MB 2.4 <3.6 NG/ML    CK-MB Index 0.2 0.0 - 2.5     MAGNESIUM   Result Value Ref Range    Magnesium 1.6 1.6 - 2.4 mg/dL   LACTIC ACID   Result Value Ref Range    Lactic acid 3.2 (HH) 0.4 - 2.0 MMOL/L   LACTIC ACID   Result Value Ref Range    Lactic acid 2.6 (HH) 0.4 - 2.0 MMOL/L   METABOLIC PANEL, BASIC   Result Value Ref Range    Sodium 140 136 - 145 mmol/L    Potassium 5.3 (H) 3.5 - 5.1 mmol/L    Chloride 103 97 - 108 mmol/L    CO2 23 21 - 32 mmol/L    Anion gap 14 5 - 15 mmol/L    Glucose 68 65 - 100 mg/dL    BUN 13 6 - 20 MG/DL    Creatinine 0.76 0.70 - 1.30 MG/DL    BUN/Creatinine ratio 17 12 - 20      GFR est AA >60 >60 ml/min/1.73m2    GFR est non-AA >60 >60 ml/min/1.73m2    Calcium 8.1 (L) 8.5 - 10.1 MG/DL   CBC W/O DIFF   Result Value Ref Range    WBC 2.0 (L) 4.1 - 11.1 K/uL    RBC 3.45 (L) 4.10 - 5.70 M/uL    HGB 10.5 (L) 12.1 - 17.0 g/dL    HCT 31.0 (L) 36.6 - 50.3 %    MCV 89.9 80.0 - 99.0 FL    MCH 30.4 26.0 - 34.0 PG    MCHC 33.9 30.0 - 36.5 g/dL    RDW 15.5 (H) 11.5 - 14.5 %    PLATELET 055 (L) 922 - 400 K/uL    MPV 11.4 8.9 - 12.9 FL    NRBC 0.0 0  WBC    ABSOLUTE NRBC 0.00 0.00 - 0.01 K/uL   CK   Result Value Ref Range     (H) 39 - 308 U/L   LACTIC ACID   Result Value Ref Range    Lactic acid 1.8 0.4 - 2.0 MMOL/L   METABOLIC PANEL, COMPREHENSIVE   Result Value Ref Range    Sodium 134 (L) 136 - 145 mmol/L    Potassium 5.3 (H) 3.5 - 5.1 mmol/L    Chloride 98 97 - 108 mmol/L    CO2 27 21 - 32 mmol/L    Anion gap 9 5 - 15 mmol/L    Glucose 122 (H) 65 - 100 mg/dL    BUN 15 6 - 20 MG/DL    Creatinine 1.14 0.70 - 1.30 MG/DL    BUN/Creatinine ratio 13 12 - 20      GFR est AA >60 >60 ml/min/1.73m2    GFR est non-AA >60 >60 ml/min/1.73m2    Calcium 8.3 (L) 8.5 - 10.1 MG/DL    Bilirubin, total 0.7 0.2 - 1.0 MG/DL    ALT (SGPT) 38 12 - 78 U/L    AST (SGOT) 112 (H) 15 - 37 U/L    Alk. phosphatase 89 45 - 117 U/L    Protein, total 7.0 6.4 - 8.2 g/dL    Albumin 2.7 (L) 3.5 - 5.0 g/dL    Globulin 4.3 (H) 2.0 - 4.0 g/dL    A-G Ratio 0.6 (L) 1.1 - 2.2     CK   Result Value Ref Range     (H) 39 - 913 U/L   METABOLIC PANEL, BASIC   Result Value Ref Range    Sodium 134 (L) 136 - 145 mmol/L    Potassium 3.6 3.5 - 5.1 mmol/L    Chloride 99 97 - 108 mmol/L    CO2 27 21 - 32 mmol/L    Anion gap 8 5 - 15 mmol/L    Glucose 113 (H) 65 - 100 mg/dL    BUN 12 6 - 20 MG/DL    Creatinine 0.88 0.70 - 1.30 MG/DL    BUN/Creatinine ratio 14 12 - 20      GFR est AA >60 >60 ml/min/1.73m2    GFR est non-AA >60 >60 ml/min/1.73m2    Calcium 8.4 (L) 8.5 - 10.1 MG/DL   CBC WITH MANUAL DIFF   Result Value Ref Range    WBC 4.2 4.1 - 11.1 K/uL    RBC 3.65 (L) 4.10 - 5.70 M/uL    HGB 11.0 (L) 12.1 - 17.0 g/dL    HCT 33.3 (L) 36.6 - 50.3 %    MCV 91.2 80.0 - 99.0 FL    MCH 30.1 26.0 - 34.0 PG    MCHC 33.0 30.0 - 36.5 g/dL    RDW 15.3 (H) 11.5 - 14.5 %    PLATELET  663 - 832 K/uL     UNABLE TO REPORT ACCURATE COUNT DUE TO PLATELET AGGREGATION, HOWEVER, PLATELETS APPEAR DECREASED IN NUMBER ON SMEAR. PLEASE RESUBMIT SODIUM CITRATE (BLUE) AND EDTA (LAVENDAR) TUBES FOR HEMATOLOGICAL TESTING.     NRBC 0.0 0  WBC    ABSOLUTE NRBC 0.00 0.00 - 0.01 K/uL    NEUTROPHILS 69 32 - 75 %    BAND NEUTROPHILS 0 0 - 6 %    LYMPHOCYTES 25 12 - 49 %    MONOCYTES 4 (L) 5 - 13 %    EOSINOPHILS 2 0 - 7 %    BASOPHILS 0 0 - 1 %    METAMYELOCYTES 0 0 %    MYELOCYTES 0 0 %    PROMYELOCYTES 0 0 %    BLASTS 0 0 %    OTHER CELL 0 0      IMMATURE GRANULOCYTES 0 %    ABS. NEUTROPHILS 2.8 1.8 - 8.0 K/UL    ABS. LYMPHOCYTES 1.1 0.8 - 3.5 K/UL    ABS. MONOCYTES 0.2 0.0 - 1.0 K/UL    ABS. EOSINOPHILS 0.1 0.0 - 0.4 K/UL    ABS. BASOPHILS 0.0 0.0 - 0.1 K/UL    ABS. IMM.  GRANS. 0.0 K/UL    DF SMEAR SCANNED      RBC COMMENTS ANISOCYTOSIS  1+       METABOLIC PANEL, BASIC   Result Value Ref Range    Sodium 136 136 - 145 mmol/L    Potassium 3.7 3.5 - 5.1 mmol/L    Chloride 99 97 - 108 mmol/L    CO2 27 21 - 32 mmol/L    Anion gap 10 5 - 15 mmol/L    Glucose 108 (H) 65 - 100 mg/dL    BUN 11 6 - 20 MG/DL    Creatinine 0.83 0.70 - 1.30 MG/DL    BUN/Creatinine ratio 13 12 - 20      GFR est AA >60 >60 ml/min/1.73m2    GFR est non-AA >60 >60 ml/min/1.73m2    Calcium 9.2 8.5 - 10.1 MG/DL   MAGNESIUM   Result Value Ref Range    Magnesium 1.0 (L) 1.6 - 2.4 mg/dL   PHOSPHORUS   Result Value Ref Range    Phosphorus 3.7 2.6 - 4.7 MG/DL   CBC W/O DIFF   Result Value Ref Range    WBC 4.8 4.1 - 11.1 K/uL    RBC 3.32 (L) 4.10 - 5.70 M/uL    HGB 10.2 (L) 12.1 - 17.0 g/dL    HCT 30.3 (L) 36.6 - 50.3 %    MCV 91.3 80.0 - 99.0 FL    MCH 30.7 26.0 - 34.0 PG    MCHC 33.7 30.0 - 36.5 g/dL    RDW 15.2 (H) 11.5 - 14.5 %    PLATELET 831 504 - 870 K/uL    MPV 12.0 8.9 - 12.9 FL    NRBC 0.4 (H) 0  WBC    ABSOLUTE NRBC 0.02 (H) 0.00 - 0.01 K/uL   CBC W/O DIFF   Result Value Ref Range    WBC 4.7 4.1 - 11.1 K/uL    RBC 3.29 (L) 4.10 - 5.70 M/uL    HGB 9.9 (L) 12.1 - 17.0 g/dL    HCT 29.7 (L) 36.6 - 50.3 %    MCV 90.3 80.0 - 99.0 FL    MCH 30.1 26.0 - 34.0 PG    MCHC 33.3 30.0 - 36.5 g/dL    RDW 15.0 (H) 11.5 - 14.5 %    PLATELET 97 (L) 994 - 400 K/uL    MPV 12.2 8.9 - 12.9 FL    NRBC 0.0 0  WBC    ABSOLUTE NRBC 0.00 0.00 - 0.01 K/uL   MAGNESIUM Result Value Ref Range    Magnesium 1.3 (L) 1.6 - 2.4 mg/dL   METABOLIC PANEL, COMPREHENSIVE   Result Value Ref Range    Sodium 134 (L) 136 - 145 mmol/L    Potassium 4.0 3.5 - 5.1 mmol/L    Chloride 98 97 - 108 mmol/L    CO2 26 21 - 32 mmol/L    Anion gap 10 5 - 15 mmol/L    Glucose 92 65 - 100 mg/dL    BUN 17 6 - 20 MG/DL    Creatinine 0.84 0.70 - 1.30 MG/DL    BUN/Creatinine ratio 20 12 - 20      GFR est AA >60 >60 ml/min/1.73m2    GFR est non-AA >60 >60 ml/min/1.73m2    Calcium 9.1 8.5 - 10.1 MG/DL    Bilirubin, total 0.5 0.2 - 1.0 MG/DL    ALT (SGPT) 27 12 - 78 U/L    AST (SGOT) 57 (H) 15 - 37 U/L    Alk. phosphatase 62 45 - 117 U/L    Protein, total 7.2 6.4 - 8.2 g/dL    Albumin 2.7 (L) 3.5 - 5.0 g/dL    Globulin 4.5 (H) 2.0 - 4.0 g/dL    A-G Ratio 0.6 (L) 1.1 - 2.2     TSH 3RD GENERATION   Result Value Ref Range    TSH 2.76 0.36 - 3.74 uIU/mL   CK   Result Value Ref Range     39 - 308 U/L   HEPATIC FUNCTION PANEL   Result Value Ref Range    Protein, total 7.2 6.4 - 8.2 g/dL    Albumin 2.8 (L) 3.5 - 5.0 g/dL    Globulin 4.4 (H) 2.0 - 4.0 g/dL    A-G Ratio 0.6 (L) 1.1 - 2.2      Bilirubin, total 0.3 0.2 - 1.0 MG/DL    Bilirubin, direct 0.2 0.0 - 0.2 MG/DL    Alk. phosphatase 63 45 - 117 U/L    AST (SGOT) 55 (H) 15 - 37 U/L    ALT (SGPT) 26 12 - 78 U/L   PLATELET COUNT   Result Value Ref Range    PLATELET 973 (L) 886 - 429 K/uL   METABOLIC PANEL, COMPREHENSIVE   Result Value Ref Range    Sodium 133 (L) 136 - 145 mmol/L    Potassium 3.8 3.5 - 5.1 mmol/L    Chloride 98 97 - 108 mmol/L    CO2 26 21 - 32 mmol/L    Anion gap 9 5 - 15 mmol/L    Glucose 98 65 - 100 mg/dL    BUN 23 (H) 6 - 20 MG/DL    Creatinine 0.95 0.70 - 1.30 MG/DL    BUN/Creatinine ratio 24 (H) 12 - 20      GFR est AA >60 >60 ml/min/1.73m2    GFR est non-AA >60 >60 ml/min/1.73m2    Calcium 9.1 8.5 - 10.1 MG/DL    Bilirubin, total 0.4 0.2 - 1.0 MG/DL    ALT (SGPT) 26 12 - 78 U/L    AST (SGOT) 49 (H) 15 - 37 U/L    Alk.  phosphatase 67 45 - 117 U/L    Protein, total 7.2 6.4 - 8.2 g/dL    Albumin 2.8 (L) 3.5 - 5.0 g/dL    Globulin 4.4 (H) 2.0 - 4.0 g/dL    A-G Ratio 0.6 (L) 1.1 - 2.2     MAGNESIUM   Result Value Ref Range    Magnesium 1.8 1.6 - 2.4 mg/dL   VITAMIN B12   Result Value Ref Range    Vitamin B12 522 193 - 986 pg/mL   HOMOCYSTEINE, PLASMA   Result Value Ref Range    Homocysteine, plasma 19.3 (H) 3.7 - 13.9 umol/L   GLUCOSE, POC   Result Value Ref Range    Glucose (POC) 76 65 - 100 mg/dL    Performed by Mina Graff    EKG, 12 LEAD, INITIAL   Result Value Ref Range    Ventricular Rate 93 BPM    Atrial Rate 93 BPM    P-R Interval 180 ms    QRS Duration 80 ms    Q-T Interval 376 ms    QTC Calculation (Bezet) 467 ms    Calculated P Axis 40 degrees    Calculated R Axis 40 degrees    Calculated T Axis 52 degrees    Diagnosis       Normal sinus rhythm  minor ST changes likely due to partially manifest early repolarization  no significant interval change  Confirmed by Bianca Parikh MD, Yo Amezcua ([id number]) on 12/29/2018 10:53:39 PM         PHYSICAL EXAM  Visit Vitals  /90 (BP 1 Location: Left arm, BP Patient Position: Supine; At rest)   Pulse 94   Temp 99 °F (37.2 °C)   Resp 25   Ht 5' 9\" (1.753 m)   Wt 172 lb (78 kg)   SpO2 94%   BMI 25.40 kg/m²     General:  Alert, cooperative, no distress. Head:  Normocephalic, without obvious abnormality, atraumatic. Eyes:  Conjunctivae/corneas clear. Pupils equal, round, reactive to light. Extraocular movements intact, VFF, NO papilledema   Lungs:  Heart:   Non labored breathing  Regular rate and rhythm, no carotid bruits   Abdomen:   Soft, non-distended   Extremities: Extremities normal, atraumatic, no cyanosis or edema. Pulses: 2+ and symmetric all extremities. Skin: Skin color, texture, turgor normal. No rashes or lesions.    Neurologic:  Gen: Attention normal             Language: naming, repetition, fluency normal             Memory: intact recent and remote memory  Cranial Nerves:  I: smell Not tested   II: visual fields Full to confrontation   II: pupils Equal, round, reactive to light   II: optic disc No papilledema   III,VII: ptosis none   III,IV,VI: extraocular muscles  Full ROM   V: mastication normal   V: facial light touch sensation  normal   VII: facial muscle function   symmetric   VIII: hearing symmetric   IX: soft palate elevation  normal   XI: trapezius strength  5/5   XI: sternocleidomastoid strength 5/5   XI: neck flexion strength  5/5   XII: tongue  midline     Motor: normal bulk and tone, no tremor              Strength: 5/5 all four extremities  Sensory: intact to LT, PP, vibration, and temperature  Coordination: FTN and HTS intact, Rhomberg negative  Gait: normal gait including tandem   Reflexes: 2+ throughout       IMPRESSION:    RECOMMENDATIONS:  1. MRI brain/ MRA head and carotids  2. TTE  3. Telemetry  4. Permissive HTN (SBP<220/<120)  5. Stroke labs (HgbA1c, TSH, lipid panel)  6. Start ASA 325mg daily  7. Start statin   8. ST/OT/PT eval ordered and will assess patient for rehab  9. Discussed personal risk factors for stroke including:   10. Discussed signs and symptoms of stroke and when to alert 911  11. VTE prophylaxis:     Thank you very much for this consultation.

## 2019-01-02 NOTE — PROGRESS NOTES
Problem: Falls - Risk of 
Goal: *Absence of Falls Document Reino Horn Fall Risk and appropriate interventions in the flowsheet. Outcome: Progressing Towards Goal 
Fall Risk Interventions: 
Mobility Interventions: PT Consult for mobility concerns Mentation Interventions: Toileting rounds, Increase mobility, Adequate sleep, hydration, pain control Medication Interventions: Evaluate medications/consider consulting pharmacy, Patient to call before getting OOB Elimination Interventions: Call light in reach History of Falls Interventions: Investigate reason for fall, Room close to nurse's station, Door open when patient unattended Problem: Pressure Injury - Risk of 
Goal: *Prevention of pressure injury Document Willard Scale and appropriate interventions in the flowsheet. Outcome: Progressing Towards Goal 
Pressure Injury Interventions: 
  
 
Moisture Interventions: Internal/External urinary devices Activity Interventions: Increase time out of bed Mobility Interventions: HOB 30 degrees or less Nutrition Interventions: Document food/fluid/supplement intake

## 2019-01-03 LAB
1,25(OH)2D3 SERPL-MCNC: 24.5 PG/ML (ref 19.9–79.3)
ACE SERPL-CCNC: 93 U/L (ref 14–82)
ATRIAL RATE: 85 BPM
CALCULATED P AXIS, ECG09: 35 DEGREES
CALCULATED R AXIS, ECG10: 11 DEGREES
CALCULATED T AXIS, ECG11: 21 DEGREES
DIAGNOSIS, 93000: NORMAL
P-R INTERVAL, ECG05: 196 MS
Q-T INTERVAL, ECG07: 360 MS
QRS DURATION, ECG06: 68 MS
QTC CALCULATION (BEZET), ECG08: 428 MS
VENTRICULAR RATE, ECG03: 85 BPM

## 2019-01-03 PROCEDURE — 93005 ELECTROCARDIOGRAM TRACING: CPT

## 2019-01-03 PROCEDURE — 97116 GAIT TRAINING THERAPY: CPT | Performed by: PHYSICAL THERAPIST

## 2019-01-03 PROCEDURE — 74011250637 HC RX REV CODE- 250/637: Performed by: INTERNAL MEDICINE

## 2019-01-03 PROCEDURE — 65270000029 HC RM PRIVATE

## 2019-01-03 PROCEDURE — 74011250637 HC RX REV CODE- 250/637: Performed by: NEUROMUSCULOSKELETAL MEDICINE & OMM

## 2019-01-03 PROCEDURE — 74011250636 HC RX REV CODE- 250/636: Performed by: INTERNAL MEDICINE

## 2019-01-03 RX ADMIN — GABAPENTIN 300 MG: 300 CAPSULE ORAL at 23:24

## 2019-01-03 RX ADMIN — ACETAMINOPHEN 500 MG: 500 TABLET, FILM COATED ORAL at 09:17

## 2019-01-03 RX ADMIN — METOPROLOL TARTRATE 25 MG: 25 TABLET ORAL at 23:24

## 2019-01-03 RX ADMIN — Medication 10 ML: at 22:00

## 2019-01-03 RX ADMIN — Medication 100 MG: at 09:17

## 2019-01-03 RX ADMIN — PANTOPRAZOLE SODIUM 40 MG: 40 TABLET, DELAYED RELEASE ORAL at 09:17

## 2019-01-03 RX ADMIN — METOPROLOL TARTRATE 25 MG: 25 TABLET ORAL at 09:17

## 2019-01-03 RX ADMIN — THERA TABS 1 TABLET: TAB at 09:17

## 2019-01-03 RX ADMIN — GABAPENTIN 300 MG: 300 CAPSULE ORAL at 15:27

## 2019-01-03 RX ADMIN — FOLIC ACID 1 MG: 1 TABLET ORAL at 09:17

## 2019-01-03 RX ADMIN — GABAPENTIN 300 MG: 300 CAPSULE ORAL at 09:17

## 2019-01-03 RX ADMIN — Medication 10 ML: at 14:06

## 2019-01-03 RX ADMIN — Medication 10 ML: at 06:03

## 2019-01-03 RX ADMIN — ENOXAPARIN SODIUM 40 MG: 40 INJECTION, SOLUTION INTRAVENOUS; SUBCUTANEOUS at 23:23

## 2019-01-03 NOTE — PROGRESS NOTES
Hospitalist Progress Note Subjective:  
Daily Progress Note: 1/3/2019 10:51 AM 
 
No hallucinations,  Still with ambulation difficulty. Current Facility-Administered Medications Medication Dose Route Frequency  pantoprazole (PROTONIX) tablet 40 mg  40 mg Oral ACB  aluminum-magnesium hydroxide (MAALOX) oral suspension 15 mL  15 mL Oral QID PRN  
 metoprolol tartrate (LOPRESSOR) tablet 25 mg  25 mg Oral Q12H  
 acetaminophen (TYLENOL) tablet 500 mg  500 mg Oral Q6H PRN  
 LORazepam (ATIVAN) injection 2 mg  2 mg IntraVENous Q1H PRN  
 LORazepam (ATIVAN) injection 4 mg  4 mg IntraVENous Q1H PRN  
 sodium chloride (NS) flush 5-10 mL  5-10 mL IntraVENous Q8H  
 sodium chloride (NS) flush 5-10 mL  5-10 mL IntraVENous PRN  
 enoxaparin (LOVENOX) injection 40 mg  40 mg SubCUTAneous Q24H  
 gabapentin (NEURONTIN) capsule 300 mg  300 mg Oral TID  thiamine HCL (B-1) tablet 100 mg  100 mg Oral DAILY  therapeutic multivitamin (THERAGRAN) tablet 1 Tab  1 Tab Oral DAILY  folic acid (FOLVITE) tablet 1 mg  1 mg Oral DAILY Review of Systems A comprehensive review of systems was negative except for that written in the HPI. Objective:  
 
Visit Vitals BP (!) 137/92 (BP 1 Location: Left arm, BP Patient Position: Sitting) Pulse 98 Temp 98.8 °F (37.1 °C) Resp 16 Ht 5' 9\" (1.753 m) Wt 78 kg (172 lb) SpO2 96% BMI 25.40 kg/m² O2 Flow Rate (L/min): 2 l/min O2 Device: Room air Temp (24hrs), Av.8 °F (37.1 °C), Min:98.1 °F (36.7 °C), Max:99.4 °F (37.4 °C) No intake/output data recorded.  1901 -  0700 In: 600 [P.O.:600] Out: 2150 [Urine:2150] Visit Vitals BP (!) 137/92 (BP 1 Location: Left arm, BP Patient Position: Sitting) Pulse 98 Temp 98.8 °F (37.1 °C) Resp 16 Ht 5' 9\" (1.753 m) Wt 78 kg (172 lb) SpO2 96% BMI 25.40 kg/m² General:  Alert, cooperative, no distress, appears stated age. Head:  Normocephalic, without obvious abnormality, atraumatic. Eyes:  Conjunctivae/corneas clear. PERRL, EOMs intact. Fundi benign Ears:  Normal TMs and external ear canals both ears. Nose: Nares normal. Septum midline. Mucosa normal. No drainage or sinus tenderness. Throat: Lips, mucosa, and tongue normal. Teeth and gums normal.  
Neck: Supple, symmetrical, trachea midline, no adenopathy, thyroid: no enlargement/tenderness/nodules, no carotid bruit and no JVD. Back:   Symmetric, no curvature. ROM normal. No CVA tenderness. Lungs:   Clear to auscultation bilaterally. Chest wall:  No tenderness or deformity. Heart:  Regular rate and rhythm, S1, S2 normal, no murmur, click, rub or gallop. Abdomen:   Soft, non-tender. Bowel sounds normal. No masses,  No organomegaly. Genitalia:  Normal male without lesion, discharge or tenderness. Rectal:  Normal tone, normal prostate, no masses or tenderness Guaiac negative stool. Extremities: Extremities normal, atraumatic, no cyanosis or edema. Pulses: 2+ and symmetric all extremities. Skin: Skin color, texture, turgor normal. No rashes or lesions Lymph nodes: Cervical, supraclavicular, and axillary nodes normal.  
Neurologic: CNII-XII intact. Normal strength, sensation and reflexes throughout. Additional comments:None Data Review No results found for this or any previous visit (from the past 24 hour(s)). Assessment/Plan:  
 
Principal Problem: 
  Alcohol intoxication delirium with moderate or severe use disorder ([address] (12/27/2018) Active Problems: 
  Syncope (12/27/2018) Stage 3 chronic kidney disease ([address] (12/27/2018) Alcoholic hepatitis (32/88/5222) Non-traumatic rhabdomyolysis (12/27/2018) 
 
abnormal mri Electrolyte abnormality with severy hypomagnesemia Resolved 
  
Alcohol intoxication delirium with moderate or severe use disorder:POA Alcohol hepatitis MVI, thiamin and folate. Pt past withdrawal phase can dc ativan. Dc phenobarb 
  
presbyesophagus - no evidence of achalasia,  Cont ppi and upright with eating.  No gi here, may need to defer egd to op. Syncopal episode Multiple falls per family Mild left nasolabial droop, unclear whether this is patient's baseline or something new Abnormal mri Nurse reports neuro was by earlier, wait on recommendations. 
  
 
Nontraumatic rhabdomyolysis Ck stable, crt stable. Marijuana abuse UDS positive for THC  
  
lvh on echo - on bblocker tachycardia improved. ? If etiology in cns related. 
  
Body mass index is 25.4 kg/m². Code Status: full Surrogate Decision Maker:niece 
  
DVT Prophylaxis: Lovenox GI Prophylaxis: not indicated 
  
  
 dispo: for with 24 hr supervision pending neuro eval. 
 
 
 
Care Plan discussed with: Patient/Family Total time spent with patient/care team: 30 minutes. Signed By: Lyndon Patten DO   
 January 3, 2019

## 2019-01-03 NOTE — PROGRESS NOTES
Bedside verbal report received from previous RN, Shahid Zeng. Pt sitting up in chair, watching TV. Call bell remains next to pt. Pt.'s status changed from PCU to medical. Per Shahid Zeng, previous RN, Dr. Cade Lundberg verbally told her to change status. Also, Dr. Cade Lundberg wrote in progress note that pt was being downgraded to medical status.

## 2019-01-03 NOTE — PROGRESS NOTES
Spiritual Care Partner Volunteer visited patient in PCU on January 1, 2019. Documented by: 
 
PIERRE Ortiz, [address],  [facility]  Paging Service  [phone])

## 2019-01-03 NOTE — PROGRESS NOTES
Neurology Progress Note Patient ID: Sophia Alvarado [id number] 
52 y.o. 
1965 Chief Complaint: Syncope Subjective:  
52 year male who was admitted to the hospital for  multiple falls. He is currently eating breakfast.  Patient reports that prior to him being admitted to the hospital he was walking and he notes that his leg just gave out. He notes that it was related to the severe pain that he was feeling on the right side which started athis hip and radiate down his leg. When he fell he reports that he did lose consciousness for a couple of seconds. This was the first time this had happened to him. Denies back injury. Denies loss of vision. Denies weakness Today patient reports that he feels much better. He denies pain. He is currently working with physical therapy and reports that that is going well. MRI showed  No acute process seenUnderlying white matter disease. Chronic long track demyelination in the posterior wicho, extending into the cerebellar peduncles. Interval development of a right high posterior parietal soft tissue scar Objective: All records in [facility] reviewed and noted ROS: 
Per HPI All other 12 pt ROS negative Meds: 
Current Facility-Administered Medications Medication Dose Route Frequency  pantoprazole (PROTONIX) tablet 40 mg  40 mg Oral ACB  aluminum-magnesium hydroxide (MAALOX) oral suspension 15 mL  15 mL Oral QID PRN  
 metoprolol tartrate (LOPRESSOR) tablet 25 mg  25 mg Oral Q12H  
 acetaminophen (TYLENOL) tablet 500 mg  500 mg Oral Q6H PRN  
 LORazepam (ATIVAN) injection 2 mg  2 mg IntraVENous Q1H PRN  
 LORazepam (ATIVAN) injection 4 mg  4 mg IntraVENous Q1H PRN  
 sodium chloride (NS) flush 5-10 mL  5-10 mL IntraVENous Q8H  
 sodium chloride (NS) flush 5-10 mL  5-10 mL IntraVENous PRN  
 enoxaparin (LOVENOX) injection 40 mg  40 mg SubCUTAneous Q24H  
 gabapentin (NEURONTIN) capsule 300 mg  300 mg Oral TID  thiamine HCL (B-1) tablet 100 mg  100 mg Oral DAILY  therapeutic multivitamin (THERAGRAN) tablet 1 Tab  1 Tab Oral DAILY  folic acid (FOLVITE) tablet 1 mg  1 mg Oral DAILY Imaging: MRI Results (most recent): 
Results from Hospital Encounter encounter on 12/27/18 MRI BRAIN W WO CONT Narrative EXAM:  MRI BRAIN W WO CONT INDICATION: Clinical cva. Recent fall. Alcohol abuse. Seizure-like activity. COMPARISON:  Recent CT performed 12/27/2018. CONTRAST: 16 ml Dotarem TECHNIQUE:   
MR imaging of the brain was performed with sagittal T1, axial T1, T2, FLAIR, 
GRE, DWI/ADC; multiplanar T1 images prior to and following uneventful 
intravenous contrast administration. FINDINGS: The ventricular size and configuration are normal. There is 
periventricular white matter hypodensity. In addition, are a few round to oval 
foci of FLAIR hyperintensity in the deep white matter including the left 
external capsule and left frontal and parietal lobes. There is also symmetric 
somewhat linear FLAIR hyperintensity extending from the posterior aspects of 
both wicho into the superior cerebellar peduncles, without a corresponding change 
on diffusion (series 601, images 9 through 10). There is no evidence of mass, 
hemorrhage, acute infarct or abnormal extra-axial fluid collection. Normal 
appearing flow-voids are present in the vertebral, basilar and carotid artery 
systems. The craniocervical junction is normal.  The structures at the cranial 
base including paranasal sinuses and mastoid air cells are unremarkable. There 
is no abnormal parenchymal or meningeal enhancement. Interval development of a 
large right parietal region skin and subcutaneous tissue superficial scar 
(coronal series 901, image 25 and axial image 28 from the recent CT). Impression IMPRESSION:  
 
No acute process seen Underlying white matter disease.  Chronic long track demyelination in the 
 posterior wicho, extending into the cerebellar peduncles. Interval development of a right high posterior parietal soft tissue scar Lab Review No results found for this or any previous visit (from the past 24 hour(s)). Exam: 
Visit Vitals BP (!) 137/92 (BP 1 Location: Left arm, BP Patient Position: Sitting) Pulse 98 Temp 98.8 °F (37.1 °C) Resp 16 Ht 5' 9\" (1.753 m) Wt 172 lb (78 kg) SpO2 96% BMI 25.40 kg/m² Gen: Well developed CV: RRR Lungs: non labored breathing Abd: non distending Neuro: A&O x 3, no dysarthria or aphasia 3+ LE reflex CN II-XII: PERRL, EOMI, face symmetric, tongue/palate midline Motor: strength 5/5 all four ext Sensory: intact to LT Gait: normal 
 
Assessment:  
 
Patient Active Problem List  
Diagnosis Code  Alcohol intoxication delirium with moderate or severe use disorder ([facility] [address] A27.071  Syncope R55  Stage 3 chronic kidney disease (HCC) N18.3  Alcoholic hepatitis G59.20  Non-traumatic rhabdomyolysis M62.82 Plan:  
R/o Demyelinating disease MRI of the cervical spine MRI of the  Lumbar spine Labs-pending Signed: 
Frankey Rama, NP 
1/3/2019 11:51 AM

## 2019-01-03 NOTE — PROGRESS NOTES
Problem: Mobility Impaired (Adult and Pediatric) Goal: *Acute Goals and Plan of Care (Insert Text) Physical Therapy Goals Initiated 12/28/2018 1. Patient will move from supine to sit and sit to supine  in bed with minimal assistance/contact guard assist within 7 day(s). 2.  Patient will transfer from bed to chair and chair to bed with minimal assistance using the least restrictive device within 7 day(s). 3.  Patient will perform sit to stand with minimal assistancewithin 7 day(s). 4.  Patient will ambulate with minimal assistancefor 50 feet with the least restrictive device within 7 day(s). physical Therapy TREATMENT Patient: Cristo Trevizo (66 y.o. male) Date: 1/3/2019 Diagnosis: Alcohol intoxication delirium with moderate or severe use disorder ([facility]) Alcohol intoxication delirium with moderate or severe use disorder ([facility] [address] Precautions: Fall Chart, physical therapy assessment, plan of care and goals were reviewed. ASSESSMENT: 
Patient received supine in bed. Patient performed supine to sit transfer with modified independence. Patient stood with stand-by assistance. Patient steady upon standing. Patient ambulated 400 feet with contact guard assistance and no device. Patient with mild unsteadiness but no overt loss of balance. Balance improved as distance increased. Patient sitting out of bed in chair and instructed to perform seated BLE exercises with good understanding. Progression toward goals: 
[x]    Improving appropriately and progressing toward goals 
[]    Improving slowly and progressing toward goals 
[]    Not making progress toward goals and plan of care will be adjusted PLAN: 
Patient continues to benefit from skilled intervention to address the above impairments. Continue treatment per established plan of care. Discharge Recommendations:  Home Health Further Equipment Recommendations for Discharge:  Patient reports that he has a straight cane at home SUBJECTIVE:  
Patient stated I feel better.  OBJECTIVE DATA SUMMARY:  
Critical Behavior: 
Neurologic State: Alert Orientation Level: Oriented X4 Cognition: Follows commands Safety/Judgement: Awareness of environment, Fall prevention, Insight into deficits Functional Mobility Training: 
Bed Mobility: 
Supine to Sit: Modified independent Scooting: Modified independent Transfers: 
Sit to Stand: Stand-by assistance Stand to Sit: Stand-by assistance Balance: 
Sitting: Intact Standing: Impaired; With support Standing - Static: Good Standing - Dynamic : Fair Ambulation/Gait Training: 
Distance (ft): 400 Feet (ft) Assistive Device: Gait belt Ambulation - Level of Assistance: Contact guard assistance Gait Abnormalities: Decreased step clearance Base of Support: Widened Speed/Jennifer: Pace decreased (<100 feet/min) Step Length: Right shortened;Left shortened Therapeutic Exercises:  
Instructed on BLE exercises to perform in chair Pain: 
Pain Scale 1: Numeric (0 - 10) Pain Intensity 1: 6 Pain Orientation 1: Right Activity Tolerance:  
Fair Please refer to the flowsheet for vital signs taken during this treatment. After treatment:  
[x]    Patient left in no apparent distress sitting up in chair 
[]    Patient left in no apparent distress in bed 
[x]    Call bell left within reach [x]    Nursing notified 
[]    Caregiver present 
[]    Bed alarm activated COMMUNICATION/COLLABORATION:  
The patients plan of care was discussed with: Registered Nurse Jeremy Mcnulty, PT Time Calculation: 10 mins

## 2019-01-03 NOTE — PROGRESS NOTES
Continue to follow for discharge planning. Dicussed with MD and team.  
Met with patient and nice in his room. Farzad Gunter [phone]. Patient in agreement to discuss plan of care with her. Patient more alert today. Able to answer questions. The nice indicates patient stay with her sister Mara De La Fuente [phone]. Patient has a sister  Mary Pino who also involved. The niece indicates the patient has two sons not involved in his care at this time. She indicates he was living in a high-rise/Senior living then his sons took him to [place]. He has been back to [place] for about a year. According to her he has not had consistent or no medical follow-up this past year. Not on his prior prescribed medications. She states she and her sister both works and they have been trying to get help from him through [employer]. Indicates they have not been able to arrange any services. She indicates he has fallen several times. Recently had walked to the store and fell- took them a while to find him. Discussed options. Reviewed PT and OT notes and recommendation. Indications for Home Health and Therapy. Initially said Rehab but made some progress. PT worked with patient with Niece present this morning. MD concerned for risk of falling- questions Rehab - Will send information to a few facilities to see if he will be approved for SNF. We discussed Home Care- Medicaid Personal Care. They are interested in [address]. Explained the process for this and this may take some time. She said she has a cousin who knows him and can provide the care. Asked her to provide this information and Home Care /Case Management Provider to send the information. Explained about the [employer] process and the approval for this. [facility] MSW RN  
[phone] Addendum: 2:59PM 
Continue to work on discharge planning.   
Talked to the Representative from [facility] information for her to review and she will get back with me. No response yet. Fax information to [facility] center to review. Talked to the Representative and she came on site to meet the patient. Talked to patient-  Interesting in accepting patient if disposition is not an issue and family supportive. Provided there the information to contact the Nice. Await call back. [address] JASON RN  
[phone]

## 2019-01-03 NOTE — PROGRESS NOTES
Problem: Falls - Risk of 
Goal: *Absence of Falls Document Mae Huerta Fall Risk and appropriate interventions in the flowsheet. Outcome: Progressing Towards Goal 
Fall Risk Interventions: 
Mobility Interventions: PT Consult for mobility concerns Mentation Interventions: Increase mobility Medication Interventions: Evaluate medications/consider consulting pharmacy Elimination Interventions: Urinal in reach History of Falls Interventions: Investigate reason for fall Problem: Pressure Injury - Risk of 
Goal: *Prevention of pressure injury Document Willard Scale and appropriate interventions in the flowsheet. Outcome: Progressing Towards Goal 
Pressure Injury Interventions: 
  
 
Moisture Interventions: Apply protective barrier, creams and emollients Activity Interventions: PT/OT evaluation Mobility Interventions: HOB 30 degrees or less Nutrition Interventions: Document food/fluid/supplement intake

## 2019-01-04 ENCOUNTER — APPOINTMENT (OUTPATIENT)
Dept: MRI IMAGING | Age: 54
DRG: 897 | End: 2019-01-04
Attending: NURSE PRACTITIONER
Payer: MEDICARE

## 2019-01-04 VITALS
BODY MASS INDEX: 25.48 KG/M2 | OXYGEN SATURATION: 100 % | DIASTOLIC BLOOD PRESSURE: 71 MMHG | TEMPERATURE: 99 F | RESPIRATION RATE: 16 BRPM | WEIGHT: 172 LBS | HEART RATE: 96 BPM | HEIGHT: 69 IN | SYSTOLIC BLOOD PRESSURE: 102 MMHG

## 2019-01-04 PROCEDURE — 72156 MRI NECK SPINE W/O & W/DYE: CPT

## 2019-01-04 PROCEDURE — 97535 SELF CARE MNGMENT TRAINING: CPT

## 2019-01-04 PROCEDURE — 74011250637 HC RX REV CODE- 250/637: Performed by: INTERNAL MEDICINE

## 2019-01-04 PROCEDURE — A9575 INJ GADOTERATE MEGLUMI 0.1ML: HCPCS | Performed by: NEUROMUSCULOSKELETAL MEDICINE & OMM

## 2019-01-04 PROCEDURE — 93005 ELECTROCARDIOGRAM TRACING: CPT

## 2019-01-04 PROCEDURE — 72158 MRI LUMBAR SPINE W/O & W/DYE: CPT

## 2019-01-04 PROCEDURE — 97110 THERAPEUTIC EXERCISES: CPT

## 2019-01-04 PROCEDURE — 74011250636 HC RX REV CODE- 250/636: Performed by: NEUROMUSCULOSKELETAL MEDICINE & OMM

## 2019-01-04 PROCEDURE — 74011250637 HC RX REV CODE- 250/637: Performed by: NEUROMUSCULOSKELETAL MEDICINE & OMM

## 2019-01-04 RX ORDER — METOPROLOL TARTRATE 25 MG/1
25 TABLET, FILM COATED ORAL EVERY 12 HOURS
Qty: 30 TAB | Refills: 3 | Status: SHIPPED | OUTPATIENT
Start: 2019-01-04

## 2019-01-04 RX ORDER — LANOLIN ALCOHOL/MO/W.PET/CERES
100 CREAM (GRAM) TOPICAL DAILY
Qty: 30 TAB | Refills: 3 | Status: SHIPPED | OUTPATIENT
Start: 2019-01-05

## 2019-01-04 RX ORDER — GABAPENTIN 300 MG/1
300 CAPSULE ORAL 3 TIMES DAILY
Qty: 30 CAP | Refills: 1 | Status: SHIPPED | OUTPATIENT
Start: 2019-01-04 | End: 2019-03-13 | Stop reason: DRUGHIGH

## 2019-01-04 RX ORDER — PANTOPRAZOLE SODIUM 40 MG/1
40 TABLET, DELAYED RELEASE ORAL
Qty: 30 TAB | Refills: 3 | Status: SHIPPED | OUTPATIENT
Start: 2019-01-05

## 2019-01-04 RX ORDER — GADOTERATE MEGLUMINE 376.9 MG/ML
16 INJECTION INTRAVENOUS
Status: COMPLETED | OUTPATIENT
Start: 2019-01-04 | End: 2019-01-04

## 2019-01-04 RX ORDER — SODIUM CHLORIDE 0.9 % (FLUSH) 0.9 %
10 SYRINGE (ML) INJECTION
Status: COMPLETED | OUTPATIENT
Start: 2019-01-04 | End: 2019-01-04

## 2019-01-04 RX ADMIN — Medication 100 MG: at 10:34

## 2019-01-04 RX ADMIN — FOLIC ACID 1 MG: 1 TABLET ORAL at 10:34

## 2019-01-04 RX ADMIN — GADOTERATE MEGLUMINE 16 ML: 376.9 INJECTION INTRAVENOUS at 09:54

## 2019-01-04 RX ADMIN — GABAPENTIN 300 MG: 300 CAPSULE ORAL at 10:34

## 2019-01-04 RX ADMIN — GABAPENTIN 300 MG: 300 CAPSULE ORAL at 16:56

## 2019-01-04 RX ADMIN — Medication 10 ML: at 16:08

## 2019-01-04 RX ADMIN — METOPROLOL TARTRATE 25 MG: 25 TABLET ORAL at 10:34

## 2019-01-04 RX ADMIN — THERA TABS 1 TABLET: TAB at 10:34

## 2019-01-04 RX ADMIN — PANTOPRAZOLE SODIUM 40 MG: 40 TABLET, DELAYED RELEASE ORAL at 10:34

## 2019-01-04 RX ADMIN — Medication 10 ML: at 09:54

## 2019-01-04 NOTE — DISCHARGE INSTRUCTIONS
Patient Education     Patient Education        Learning About Alcohol Withdrawal  What is alcohol withdrawal?    If you drink alcohol regularly (more than a few drinks on most days) and then suddenly stop or cut down, you may go through some physical and emotional problems while the alcohol clears out of your system. This is called withdrawal. Clearing the alcohol from your body is called detoxification, or detox. What are the symptoms? Symptoms of alcohol withdrawal may start as soon as 4 to 12 hours after you stop drinking. Or they may not start until several days after the last drink. Mild symptoms include:  · Nausea. · Sweating. · Shakiness. · Diarrhea. · Intense worry. · Disturbed sleep. · Headache. More severe symptoms include:  · Vomiting or belly pain. · Being confused, upset, and irritable. · Changed sensations. You might feel things on your body that aren't really there. Or you may see or hear things that aren't there. · Trembling. · Being short of breath or having pain in your chest.  · Having seizures. Symptoms may peak within a few days. Mild symptoms can last for a few weeks. If your symptoms are severe, you'll need to see a doctor. What is the treatment for alcohol withdrawal?  Most people may be able to cut down or stop drinking with only mild withdrawal. They can stay safe by simply resting, drinking lots of fluids, and eating healthy foods. But people who drink large amounts of alcohol or are at risk for severe withdrawal symptoms should not try to detox at home unless they work closely with a doctor to manage it. A person can die of severe alcohol withdrawal.  Before you stop drinking, talk to your doctor about how you plan to stop. Be completely honest about how much you've been drinking. Your doctor will figure out if you need to detox in a medical center. You may get medicine to treat the symptoms whether you are at home or in a medical center.  Medicine that treats seizures can also help. Your doctor will explain what types of medicine might help you. You may start with a high dose and then take smaller amounts over several days. There's also medicine that can help you avoid alcohol while you recover. How can you manage your withdrawal and recovery? Here are a few tips that can help you to not start drinking again. · Make sure there's no alcohol in the house. This includes drinks as well as liquid medicines, rubbing alcohol, and certain flavorings like vanilla extract. · Try not to hang out with people you used to drink with. · Don't go it alone. Spend time with people who support the changes you are making in your life. This includes asking for advice and help from people who have stopped drinking. You might also try mutual support groups such as Alcoholics Anonymous. · Drink lots of fluids. · Eat snacks such as fruit, cheese and crackers, and pretzels. High-carbohydrate foods may help reduce the craving for alcohol. What happens after withdrawal?  It can be hard to stop drinking. But after you clear the alcohol from your system, you can start the next, healthier part of your life. After detox, you will focus on staying alcohol-free. You can learn skills that you can use to stay abstinent (or sober) as you recover. Finding new ways to deal with life's challenges, without drinking, takes time and effort. Recovery is a long-term process. It's not something you can achieve in a few weeks. Most people get some type of therapy, such as group counseling. You also may need medicine to help you stay sober. Treatment doesn't focus on alcohol use alone. It may address other parts of your life, like your relationships, work, medical problems, and home life. Treatment, support, patience, and commitment will help you make the changes you need to live a zavala life without alcohol.  You may find, over time, that the process gets easier, life becomes more joyous, and your connections to others becomes more rewarding. Where can you find help? Behavioral Health Treatment Services . This service from the [facility] and [facility]  can help you find local alcohol treatment services. Search online at [employer] [place] [facility].gov or call 2-863-424-HELP ([phone]), or TDD 1-111.716.2995. Where can you learn more? Go to http://brittany-luis.info/. Enter A011 in the search box to learn more about \"Learning About Alcohol Withdrawal.\"  Current as of: October 9, 2017  Content Version: 11.8  © 0729-5311 Healthwise, [employer]. Care instructions adapted under license by [employer] (which disclaims liability or warranty for this information). If you have questions about a medical condition or this instruction, always ask your healthcare professional. [address] any warranty or liability for your use of this information.

## 2019-01-04 NOTE — PROGRESS NOTES
Hospitalist Progress Note Subjective:  
Daily Progress Note: 2019 12:42 PM 
 
No events overnight. Tolerating diet. No tremor no hallucinations. Current Facility-Administered Medications Medication Dose Route Frequency  pantoprazole (PROTONIX) tablet 40 mg  40 mg Oral ACB  aluminum-magnesium hydroxide (MAALOX) oral suspension 15 mL  15 mL Oral QID PRN  
 metoprolol tartrate (LOPRESSOR) tablet 25 mg  25 mg Oral Q12H  
 acetaminophen (TYLENOL) tablet 500 mg  500 mg Oral Q6H PRN  
 LORazepam (ATIVAN) injection 2 mg  2 mg IntraVENous Q1H PRN  
 LORazepam (ATIVAN) injection 4 mg  4 mg IntraVENous Q1H PRN  
 sodium chloride (NS) flush 5-10 mL  5-10 mL IntraVENous Q8H  
 sodium chloride (NS) flush 5-10 mL  5-10 mL IntraVENous PRN  
 enoxaparin (LOVENOX) injection 40 mg  40 mg SubCUTAneous Q24H  
 gabapentin (NEURONTIN) capsule 300 mg  300 mg Oral TID  thiamine HCL (B-1) tablet 100 mg  100 mg Oral DAILY  therapeutic multivitamin (THERAGRAN) tablet 1 Tab  1 Tab Oral DAILY  folic acid (FOLVITE) tablet 1 mg  1 mg Oral DAILY Review of Systems A comprehensive review of systems was negative except for that written in the HPI. Objective:  
 
Visit Vitals /86 (BP 1 Location: Left arm, BP Patient Position: At rest) Pulse 84 Temp 97.6 °F (36.4 °C) Resp 16 Ht 5' 9\" (1.753 m) Wt 78 kg (172 lb) SpO2 100% BMI 25.40 kg/m² O2 Flow Rate (L/min): 2 l/min O2 Device: Room air Temp (24hrs), Av.7 °F (36.5 °C), Min:96.7 °F (35.9 °C), Max:98.8 °F (37.1 °C) 
 
 
701 -  1900 In: -  
Out: 100 [Urine:100] 1901 -  0700 In: 360 [P.O.:360] Out: 1200 [Urine:1200] Visit Vitals /86 (BP 1 Location: Left arm, BP Patient Position: At rest) Pulse 84 Temp 97.6 °F (36.4 °C) Resp 16 Ht 5' 9\" (1.753 m) Wt 78 kg (172 lb) SpO2 100% BMI 25.40 kg/m² General:  Alert, cooperative, no distress, appears stated age. Head:  Normocephalic, without obvious abnormality, atraumatic. Eyes:  Conjunctivae/corneas clear. PERRL, EOMs intact. Fundi benign Ears:  Normal TMs and external ear canals both ears. Nose: Nares normal. Septum midline. Mucosa normal. No drainage or sinus tenderness. Throat: Lips, mucosa, and tongue normal. Teeth and gums normal.  
Neck: Supple, symmetrical, trachea midline, no adenopathy, thyroid: no enlargement/tenderness/nodules, no carotid bruit and no JVD. Back:   Symmetric, no curvature. ROM normal. No CVA tenderness. Lungs:   Clear to auscultation bilaterally. Chest wall:  No tenderness or deformity. Heart:  Regular rate and rhythm, S1, S2 normal, no murmur, click, rub or gallop. Abdomen:   Soft, non-tender. Bowel sounds normal. No masses,  No organomegaly. Genitalia:  Normal male without lesion, discharge or tenderness. Rectal:  Normal tone, normal prostate, no masses or tenderness Guaiac negative stool. Extremities: Extremities normal, atraumatic, no cyanosis or edema. Pulses: 2+ and symmetric all extremities. Skin: Skin color, texture, turgor normal. No rashes or lesions Lymph nodes: Cervical, supraclavicular, and axillary nodes normal.  
Neurologic: CNII-XII intact. Normal strength, sensation and reflexes throughout. Additional comments:I reviewed the patient's new clinical lab test results. ekg wnl Data Review Recent Results (from the past 24 hour(s)) EKG, 12 LEAD, SUBSEQUENT Collection Time: 01/04/19  8:51 AM  
Result Value Ref Range Ventricular Rate 90 BPM  
 Atrial Rate 90 BPM  
 P-R Interval 180 ms QRS Duration 66 ms  
 Q-T Interval 350 ms QTC Calculation (Bezet) 428 ms Calculated P Axis 42 degrees Calculated R Axis 16 degrees Calculated T Axis 21 degrees Diagnosis Normal sinus rhythm Normal ECG When compared with ECG of 03-JAN-2019 11:49, No significant change was found Assessment/Plan:  
 
Principal Problem: Alcohol intoxication delirium with moderate or severe use disorder [facility] (12/27/2018) Active Problems: 
  Syncope (12/27/2018) Stage 3 chronic kidney disease ([facility] [address] (12/27/2018) Alcoholic hepatitis (68/33/9490) Non-traumatic rhabdomyolysis (12/27/2018) 
 
abnormal mri - mri c and l spine pending. Seen by neurology. 
  
Electrolyte abnormality with severy hypomagnesemia Resolved 
  
Alcohol intoxication delirium with moderate or severe use disorder:POA Alcohol hepatitis 
 resolved. MVI, thiamin and folate. 
 
  
presbyesophagus - no evidence of achalasia,  Cont ppi and upright with eating.  No gi here, plan for op referreal at this time. Syncopal episode Multiple falls per family Mild left nasolabial droop, unclear whether this is patient's baseline or something new Spine mri pending to further investigate any degenerative disorder. 
  
 
Nontraumatic rhabdomyolysis Ck stable, crt stable. Marijuana abuse UDS positive for THC  
  
lvh on echo - on bblocker tachycardia improved. ? If etiology in cns related. 
  
Body mass index is 25.4 kg/m². Code Status: full Surrogate Decision Maker:niece 
  
DVT Prophylaxis: Lovenox GI Prophylaxis: not indicated 
  
  
 dispo: inpt rehab pending.  
 
 
 
 
Care Plan discussed with: Patient/Family Total time spent with patient/care team: 20 minutes. Signed By: Lyndon Patten DO   
 January 4, 2019

## 2019-01-04 NOTE — PROGRESS NOTES
0729) Bedside shift change report given to Ubaldo Schirmer, RN (oncoming nurse) by Jesús Kumari RN (offgoing nurse). Report included the following information SBAR, Kardex, MAR, Accordion and Recent Results. 2149) pt downstairs for MRI. Family in room. 1040) pt return to room

## 2019-01-04 NOTE — PROGRESS NOTES
9986) Bedside shift change report given to Nasim Villatoro, RN (oncoming nurse) by Rehan Marks RN (offgoing nurse). Report included the following information SBAR, Kardex, MAR, Accordion and Recent Results. 1500) Spoke to Dr. Damion Neville-- MRI resulted. Consult to neurology for recommendations 1528) Spoke to Dr. Nirav Woodall. Plan to review results and return call.  
[phone]) Message from Dr. Nirav Woodall,  Pt able to [id number] with physical therapy and neurology. No changes to medication. [phone]) Discuss with Dr. Damion Neville message form Dr. Nirav Woodall. 1628) Family left for the day--stated not sure if able to come back tonight if change in discharge planning. [phone] with hanna Hogan, plan to pick patient up after work- will be here before 7 pm.  
9023) Consult with Leno Henderson, care manager, call to [facility] Never [phone], plan to come back to transport patient. [phone])  . Scar Page Reviewed discharge instructions with pt including follow-up appointments, new medications and side effects, medications to continue, medications to discontinue, alcohol withdrawal  education, and MyChart information. BRAINS sheet reviewed. Pt expressed understanding. IV was removed. Belongings sent home with pt.  
1219) Pt wheeled downstairs for discharge

## 2019-01-04 NOTE — PROGRESS NOTES
Problem: Self Care Deficits Care Plan (Adult) Goal: *Acute Goals and Plan of Care (Insert Text) Occupational Therapy Goals Initiated 12/31/2018 1. Patient will perform grooming and bathing standing at sink with contact guard assist within 7 day(s). 2.  Patient will perform lower body dressing with minimal assistance/contact guard assist within 7 day(s). 3.  Patient will perform toilet transfers with minimal assistance/contact guard assist using best DME for safety within 7 day(s). 4.  Patient will perform all aspects of toileting with minimal assistance/contact guard assist within 7 day(s). 5.  Patient will participate in upper extremity therapeutic exercise/activities with independence for 10 minutes within 7 day(s). 6.  Patient will utilize energy conservation techniques during functional activities with verbal and visual cues within 7 day(s). Occupational Therapy TREATMENT Patient: Burgess Scruggs (37 y.o. male) Date: 1/4/2019 Diagnosis: Alcohol intoxication delirium with moderate or severe use disorder ([facility]) Alcohol intoxication delirium with moderate or severe use disorder ([facility] Precautions: Fall Chart, occupational therapy assessment, plan of care, and goals were reviewed. ASSESSMENT: 
Cleared by RN to see pt for therapy session. Pt received seated in chair with family present, agreeable to participate. Stood from chair with CGA and ambulated in room and hallway without use of AD, mild unsteadiness occassionally but no LOB. Performed standing voiding in bathroom, bladder hygiene, standing grooming ADLs at sink with CGA. Pt took walk in hallway per request, then returned to sitting in chair in room. C/o mild pain in R hip when ambulating. Educated pt on LE and UE exercises to perform to increase strength for ADLs and functional transfers.  Pt performed exercises for approx 6 minutes with verbal cueing for technique and pacing. Pt in chair at end of session with family present. No tremors noted and pt with good participation and engagement in session. Vitals stable throughout session. Pt has made significant progress from evaluation. Recommend HHOT at discharge to continue increasing independence and safety. Progression toward goals: 
[x]       Improving appropriately and progressing toward goals 
[]       Improving slowly and progressing toward goals 
[]       Not making progress toward goals and plan of care will be adjusted PLAN: 
Patient continues to benefit from skilled intervention to address the above impairments. Continue treatment per established plan of care. Discharge Recommendations:  Home Health Further Equipment Recommendations for Discharge:  None for OT SUBJECTIVE:  
Patient stated I did 10 reps.  OBJECTIVE DATA SUMMARY:  
Cognitive/Behavioral Status: 
Neurologic State: Alert Orientation Level: Oriented to person;Oriented to place;Oriented to situation Cognition: Follows commands Perception: Appears intact Perseveration: No perseveration noted Safety/Judgement: Awareness of environment; Fall prevention;Decreased awareness of need for safety Functional Mobility and Transfers for ADLs:Bed Mobility: 
 Received in chair Transfers: 
Sit to Stand: Stand-by assistance Balance: 
Sitting: Intact Sitting - Static: Good (unsupported) Sitting - Dynamic: Good (unsupported) Standing: Impaired Standing - Static: Fair Standing - Dynamic : Fair ADL Intervention: 
  
 
Grooming Washing Hands: Contact guard assistance(standing at sink) Lower Body Dressing Assistance Socks: Supervision/set-up(bending forward to reach L, leg crossover for R) Position Performed: Bending forward method;Seated in chair Cues: Doff;Don;Verbal cues provided Toileting Bladder Hygiene: Contact guard assistance(standing) Cognitive Retraining Safety/Judgement: Awareness of environment; Fall prevention;Decreased awareness of need for safety Therapeutic Exercises:  
1x10 B shoulder flex/ext, elbow flex/ext, ankle pumps, knee ext/flex and seated marchesPain: 
Pain Scale 1: Numeric (0 - 10) Pain Intensity 1: 0 Activity Tolerance:  
Good Please refer to the flowsheet for vital signs taken during this treatment. After treatment:  
[x] Patient left in no apparent distress sitting up in chair 
[] Patient left in no apparent distress in bed 
[x] Call bell left within reach [x] Nursing notified 
[x] Caregiver present 
[] Bed alarm activated COMMUNICATION/COLLABORATION:  
The patients plan of care was discussed with: Physical Therapist and Registered Nurse Lige Boas, OT Time Calculation: 23 mins

## 2019-01-04 NOTE — DISCHARGE SUMMARY
Physician Discharge Summary       Patient: Dianna Kingston MRN: [id number]  SSN: [id number]    YOB: 1965  Age: 52 y.o. Sex: male    PCP: Phyllis Hernandez MD    Allergies: Aspirin    Admit date: 12/27/2018  Admitting Provider: Lizandro Schmid MD    Discharge date: 1/4/2019  Discharging Provider: Beth Arciniega DO    * Admission Diagnoses: Alcohol intoxication delirium with moderate or severe use disorder [facility])    * Discharge Diagnoses:    Hospital Problems as of 1/4/2019 Date Reviewed: 1/4/2019          Codes Class Noted - Resolved POA    * (Principal) Alcohol intoxication delirium with moderate or severe use disorder ([facility] ICD-10-CM: I84.996  ICD-9-CM: 291.0, 303.90  12/27/2018 - Present Unknown        Syncope ICD-10-CM: R55  ICD-9-CM: 780.2  12/27/2018 - Present Unknown        Stage 3 chronic kidney disease ([facility] ICD-10-CM: N18.3  ICD-9-CM: 585.3  12/27/2018 - Present Unknown        Alcoholic hepatitis QVB-15-DZ: K70.10  ICD-9-CM: 571.1  12/27/2018 - Present Unknown        Non-traumatic rhabdomyolysis ICD-10-CM: M62.82  ICD-9-CM: 728.88  12/27/2018 - Present Unknown              * Hospital Course: 52year old male was admitted with etoh intoxication and frequent falls for many years and mild rhabodymyeysis. The pt was placed on ciwa and underwent an mri which was concerning for demyelinating disease. The pt was seen by neuroloyg and underwent mri of spine showing moderate djd with canal stenosis. The pt will follow up with neuro for further care. 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 The pt will fu with gi as op.   The pt is planned for dc to rehab today    * Procedures: esophagram - presbyesophagus  * No surgery found *      Consults: Neurology    Significant Diagnostic Studies: radiology: MRI: ? demyelination    Discharge Exam:  Visit Vitals  /71 (BP 1 Location: Left arm, BP Patient Position: At rest)   Pulse 96   Temp 99 °F (37.2 °C)   Resp 16   Ht 5' 9\" (1.753 m)   Wt 78 kg (172 lb)   SpO2 100%   BMI 25.40 kg/m²     General:  Alert, cooperative, no distress, appears stated age. Head:  Normocephalic, without obvious abnormality, atraumatic. Eyes:  Conjunctivae/corneas clear. PERRL, EOMs intact. Fundi benign   Ears:  Normal TMs and external ear canals both ears. Nose: Nares normal. Septum midline. Mucosa normal. No drainage or sinus tenderness. Throat: Lips, mucosa, and tongue normal. Teeth and gums normal.   Neck: Supple, symmetrical, trachea midline, no adenopathy, thyroid: no enlargement/tenderness/nodules, no carotid bruit and no JVD. Back:   Symmetric, no curvature. ROM normal. No CVA tenderness. Lungs:   Clear to auscultation bilaterally. Chest wall:  No tenderness or deformity. Heart:  Regular rate and rhythm, S1, S2 normal, no murmur, click, rub or gallop. Extremities: Extremities normal, atraumatic, no cyanosis or edema. Pulses: 2+ and symmetric all extremities. Skin: Skin color, texture, turgor normal. No rashes or lesions   Lymph nodes: Cervical, supraclavicular, and axillary nodes normal.   Neurologic: CNII-XII intact. Normal strength, sensation and reflexes throughout. * Discharge Condition: improved  * Disposition: [address]  [facility] ([facility])    Discharge Medications:  Current Discharge Medication List      START taking these medications    Details   folic acid-vit P3-ORA R86 (FOLTX) 2.5-25-2 mg tablet Take 1 Tab by mouth daily. Qty: 30 Tab, Refills: 3      gabapentin (NEURONTIN) 300 mg capsule Take 1 Cap by mouth three (3) times daily. Qty: 30 Cap, Refills: 1      metoprolol tartrate (LOPRESSOR) 25 mg tablet Take 1 Tab by mouth every twelve (12) hours.   Qty: 30 Tab, Refills: 3      pantoprazole (PROTONIX) 40 mg tablet Take 1 Tab by mouth Daily (before breakfast). Qty: 30 Tab, Refills: 3      thiamine HCL (B-1) 100 mg tablet Take 1 Tab by mouth daily. Qty: 30 Tab, Refills: 3      aluminum-magnesium hydroxide (MAALOX) 200-200 mg/5 mL suspension Take 15 mL by mouth four (4) times daily as needed for Indigestion. Qty: 3 mL, Refills: 3             * Follow-up Care/Patient Instructions: Activity: Activity as tolerated  Diet: soft mechanical and sit upright for meals  Wound Care: None needed    Follow-up Information     Follow up With Specialties Details Why Contact Info    Dusty Abebe MD Family Practice On 1/3/2019 Appointment scheduled for 11 am. Please bring insurance card, list of medication, and photo ID. [address]  support Warm Katie Maciel     [phone]  Talked to train individuals with lived experience in addiction recovery.  Safe and Confidential   8:00AM - 12:00 Midnight 7 days/week    Abhay Lucas MD Neurology On 1/15/2019 neurology appointment 1:30PM  [facility] [phone]      [facility]  Skilled Care, Nursing Care, Medication management, Pt, OT, Case Management  [address] [facility] [address]  [facility]  In 1 week presbyesophagus on esophagram [address]  [phone]        > 30 minuts spent on discharge  Signed:  Dilan Villanueva DO  1/4/2019  4:46 PM

## 2019-01-04 NOTE — PROGRESS NOTES
Discussed in rounds this am with MD and team.  
Talked to family multiple family members. Some met for the first time today. They indicated they just learned he was in the hospital. Some concern about the current environment and living situation. Indicates where he lives it impacts his drinking and readily availability to alcohol. None of these family members could provide care in their home. Some questions about POA. Could not find any forms on the chart. Nikaruna Farmer ( [phone] indicates when he was here in 2014 she was given permission to make decisions for him at that time [facility]. Could not find this paperwork. She did not produce her copies. Frederic Farmer indicates she had found him housing [facility]. Indicates he kept asking about his family. His son moved him to [place] but this arrangement did not last long. He came back to [facility]. His sister could not keep him- due to new marriage. The Niece Omid Monte was the only one at that time to take him in. They indicates prior to going to [place] had stopped or limit his drinking- He was going to support group. They are all in agreement for SNF at [facility]. Encouraged them to talk as a family to make plans for patient post rehab. They are aware patient has to agree to plans. Several indicate he need to look at different housing. Verbal ok for choice of agency - copy on chart. Verbal ok for Transfer to SNF. Copy of Medicare Second IMM letter on chart. Addendum: 
Talked to [place] Pleasure who patient was living with prior to admit. She is aware of the sister and niece visit this morning. She want to wait for a while to see if she will be able to transport patient to the facility. Addendum: Earlier patient request his belonging brought to the room. Security came to the floor with them and discussed with nursing.  
  Omid Monte came by the office to request help with getting a letter signed so she could take to the bank to withdraw funds. Asked the reason for the urgency- since patient would be in the rehab for 1-2 weeks. She states to help pay for the rent. Indicates she normally takes patient with her to the bank. She indicates she and patient share the expenses for the apartment. Informed her I could not provide this type of letter. If she was planning on taking patient to the SNF- Addendum  MRI results are in. Talked to MD to have outpatient follow-up with Neurology. Appointment on AVS. To transition to the SNF. Jodi Marsh [facility].. [id number]. Called the Niece and talked to her and her daughter. She will provide transportation. UAI completed and being sent to the facility. The  will need to follow-up and arranged Consumer Direct Care. Notified Maritza Herring. She provided me with the  Facilitator  to contact is Ms. Joe Murphy [phone]. This information provided to the SNF. Nursing to call report and send medical information. ( Information copied to go with patient. H&P, Consults,  Imaging, Discharge Instruction, Discharge Summary, UAI, Therapy notes) Follow-up Information   
  Follow up With Specialties Details Why Contact Info  
  Bianka Luis MD Family Practice On 1/18/2019 Appointment scheduled for post rehab [facility] bring insurance card, list of medication, and photo ID. [address] [phone]  
[phone]   
  Addiction Recoveery support Warm Line    Please arrange Support Group or AA meetings post rehab  [phone] Talked to train individuals with lived experience in addiction recovery. Safe and Confidential  
8:00AM - 12:00 Midnight 7 days/week  
  Norma Gupta MD Neurology On 1/15/2019 neurology appointment 1:30PM  [address] [facility] Дмитрий Guzman [address] [place] [address] PSYCHIATRIC) [address] [place] [address], Nursing Care, Medication management, Pt, OT, Case Management  [address] [facility] [address]  
[address] [facility]  In 1 week presbyesophagus on esophagram [id number]  
  Consumer Direct Care     Please have the SW to arrange [place] Consumer Direct Care.  Facilitator  to contact is Ms. Lorena Nielsen [phone]  
  Family Contact numbers & Schedule Family Conference for Discharge Planning    Lives with Nikaruna Santos Setting [phone] Sister Beverley Delgado [phone] Mendel Abed Niece [phone] Reta Niece [phone] Laurel Pyo Niece [phone] [facility]  
   
 
Care Management Interventions PCP Verified by CM: Yes Mode of Transport at Discharge: Other (see comment)(family) Transition of Care Consult (CM Consult): SNF, Discharge Planning Discharge Durable Medical Equipment: No 
Physical Therapy Consult: Yes Occupational Therapy Consult: Yes Speech Therapy Consult: Yes Current Support Network: Relative's Home Confirm Follow Up Transport: Family Plan discussed with Pt/Family/Caregiver: Yes Freedom of Choice Offered: Yes Discharge Location Discharge Placement: Skilled nursing facility

## 2019-01-04 NOTE — PROGRESS NOTES
Neurology Progress Note Patient ID: Raghav Vasquez [id number] 
52 y.o. 
1965 Chief Complaint: 
 
Subjective:  
Family members in the room. Patient downstairs  for MRI testing. Family Informed me that the patient had a HX of encephalitis Objective: All [facility] in [facility] reviewed and noted ROS: 
Per HPI All other 12 pt ROS negative Meds: 
Current Facility-Administered Medications Medication Dose Route Frequency  pantoprazole (PROTONIX) tablet 40 mg  40 mg Oral ACB  aluminum-magnesium hydroxide (MAALOX) oral suspension 15 mL  15 mL Oral QID PRN  
 metoprolol tartrate (LOPRESSOR) tablet 25 mg  25 mg Oral Q12H  
 acetaminophen (TYLENOL) tablet 500 mg  500 mg Oral Q6H PRN  
 LORazepam (ATIVAN) injection 2 mg  2 mg IntraVENous Q1H PRN  
 LORazepam (ATIVAN) injection 4 mg  4 mg IntraVENous Q1H PRN  
 sodium chloride (NS) flush 5-10 mL  5-10 mL IntraVENous Q8H  
 sodium chloride (NS) flush 5-10 mL  5-10 mL IntraVENous PRN  
 enoxaparin (LOVENOX) injection 40 mg  40 mg SubCUTAneous Q24H  
 gabapentin (NEURONTIN) capsule 300 mg  300 mg Oral TID  thiamine HCL (B-1) tablet 100 mg  100 mg Oral DAILY  therapeutic multivitamin (THERAGRAN) tablet 1 Tab  1 Tab Oral DAILY  folic acid (FOLVITE) tablet 1 mg  1 mg Oral DAILY Imaging: 
 
Lab Review Recent Results (from the past 24 hour(s)) EKG, 12 LEAD, SUBSEQUENT Collection Time: 01/04/19  8:51 AM  
Result Value Ref Range Ventricular Rate 90 BPM  
 Atrial Rate 90 BPM  
 P-R Interval 180 ms QRS Duration 66 ms  
 Q-T Interval 350 ms QTC Calculation (Bezet) 428 ms Calculated P Axis 42 degrees Calculated R Axis 16 degrees Calculated T Axis 21 degrees Diagnosis Normal sinus rhythm Normal ECG When compared with ECG of 03-JAN-2019 11:49, No significant change was found Exam: 
Visit Vitals /86 (BP 1 Location: Left arm, BP Patient Position: At rest) Pulse 84 Temp 97.6 °F (36.4 °C) Resp 16 Ht 5' 9\" (1.753 m) Wt 172 lb (78 kg) SpO2 100% BMI 25.40 kg/m² Gen: Well developed CV: RRR Lungs: non labored breathing Abd: non distending Neuro: A&O x 3, no dysarthria or aphasia CN II-XII: PERRL, EOMI, face symmetric, tongue/palate midline Motor: strength 5/5 all four ext Sensory: intact to LT Gait: normal 
 
Assessment:  
 
Patient Active Problem List  
Diagnosis Code  Alcohol intoxication delirium with moderate or severe use disorder ([facility] C83.213  Syncope R55  Stage 3 chronic kidney disease (HCC) N18.3  Alcoholic hepatitis M25.92  Non-traumatic rhabdomyolysis M62.82 Plan: MRI Cerv and Lumbar- pending If MRI are normal can be discharged to rehab Follow up visit at Neurology clinic after discharge Signed: 
Mary Lou Kim NP 
1/4/2019 
1:45 PM

## 2019-01-04 NOTE — CONSULTS
NEUROLOGY CONSULTATION    DATE OF CONSULTATION: 12/31/2018  CONSULTED BY:Dr Jackson Mark      HISTORY OF PRESENT ILLNESS  Baldev Dunn is a 52 y.o. Right handed black  male with history of Etoh abuse, Asthma, HTN, who was admitted for syncope, frequent fall. Patient is a poor historian, most of the history obtained from the chart, according to the chart, patient was brought in by his niece because he was complaining of generalized pain and difficulty ambulating. Upon questioning patient, he was a bit  confused, however, he said that he drinks about 4-5 drinks every day, and he has been backing mostly after drinking sometimes during the drinking. He says he never had any warning, according to patient, the legs will give out on him at times. He says his legs weak  He denies difficulty swallowing, odynophagia.   Review of Systems - General ROS: positive for  - fatigue, night sweats and sleep disturbance  Psychological ROS: positive for - anxiety and sleep disturbances  Ophthalmic ROS: positive for - blurry vision and decreased vision  ENT ROS: positive for - tinnitus, vertigo and visual changes  Allergy and Immunology ROS: negative  Hematological and Lymphatic ROS: negative  Endocrine ROS: negative  Respiratory ROS: no cough, shortness of breath, or wheezing  Cardiovascular ROS: no chest pain or dyspnea on exertion  Gastrointestinal ROS: no abdominal pain, change in bowel habits, or black or bloody stools  Genito-Urinary ROS: no dysuria, trouble voiding, or hematuria  Musculoskeletal ROS: positive for - gait disturbance, joint pain, joint stiffness, joint swelling, muscle pain and muscular weakness  Neurological ROS: positive for - confusion, dizziness, gait disturbance, numbness/tingling, tremors, visual changes and weakness  Dermatological ROS: negative      PMH  Past Medical History:   Diagnosis Date    Alcohol abuse     Asthma     Hypertension        SH  Social History Socioeconomic History    Marital status: SINGLE     Spouse name: Not on file    Number of children: Not on file    Years of education: Not on file    Highest education level: Not on file   Tobacco Use    Smoking status: Current Every Day Smoker    Smokeless tobacco: Never Used   Substance and Sexual Activity    Alcohol use: Yes    Drug use: No       FH  History reviewed. No pertinent family history.     ALLERGIES  Allergies   Allergen Reactions    Aspirin Nausea and Vomiting       CURRENT MEDS  Current Facility-Administered Medications   Medication Dose Route Frequency Provider Last Rate Last Dose    aluminum-magnesium hydroxide (MAALOX) oral suspension 15 mL  15 mL Oral QID PRN Hima Aguilar DO        metoprolol tartrate (LOPRESSOR) tablet 25 mg  25 mg Oral Q12H Hima Aguilar, DO   25 mg at 01/01/19 2120    pantoprazole (PROTONIX) 40 mg in sodium chloride 0.9% 10 mL injection  40 mg IntraVENous DAILY Hima Aguilar, DO   40 mg at 01/01/19 0950    acetaminophen (TYLENOL) tablet 500 mg  500 mg Oral Q6H PRN Hima Aguilar DO   500 mg at 01/01/19 1222    LORazepam (ATIVAN) injection 2 mg  2 mg IntraVENous Q1H PRN Joel Wren MD   2 mg at 12/31/18 1739    LORazepam (ATIVAN) injection 4 mg  4 mg IntraVENous Q1H PRN Joel Wren MD   4 mg at 12/29/18 1000    sodium chloride (NS) flush 5-10 mL  5-10 mL IntraVENous Q8H Joel Wern MD   10 mL at 01/02/19 0556    sodium chloride (NS) flush 5-10 mL  5-10 mL IntraVENous PRN Joel Wren MD        enoxaparin (LOVENOX) injection 40 mg  40 mg SubCUTAneous Q24H Joel Wren MD   40 mg at 01/01/19 2120    gabapentin (NEURONTIN) capsule 300 mg  300 mg Oral TID Joel Wren MD   300 mg at 01/01/19 2122    thiamine HCL (B-1) tablet 100 mg  100 mg Oral DAILY Joel Wren MD   100 mg at 01/01/19 0950    therapeutic multivitamin (THERAGRAN) tablet 1 Tab  1 Tab Oral DAILY Joel Wren MD   1 Tab at 01/01/19 0144    folic acid (FOLVITE) tablet 1 mg  1 mg Oral DAILY Camilla Zapata MD   1 mg at 01/01/19 0950       ROS  As per HPI  LABS  Results for orders placed or performed during the hospital encounter of 12/27/18   CULTURE, MRSA   Result Value Ref Range    Special Requests: NO SPECIAL REQUESTS      Culture result: MRSA NOT PRESENT      Culture result:            Screening of patient nares for MRSA is for surveillance purposes and, if positive, to facilitate isolation considerations in high risk settings. It is not intended for automatic decolonization interventions per se as regimens are not sufficiently effective to warrant routine use. CBC WITH AUTOMATED DIFF   Result Value Ref Range    WBC 2.8 (L) 4.1 - 11.1 K/uL    RBC 3.85 (L) 4.10 - 5.70 M/uL    HGB 11.5 (L) 12.1 - 17.0 g/dL    HCT 34.4 (L) 36.6 - 50.3 %    MCV 89.4 80.0 - 99.0 FL    MCH 29.9 26.0 - 34.0 PG    MCHC 33.4 30.0 - 36.5 g/dL    RDW 15.4 (H) 11.5 - 14.5 %    PLATELET 596 864 - 284 K/uL    NRBC 0.0 0  WBC    ABSOLUTE NRBC 0.00 0.00 - 0.01 K/uL    NEUTROPHILS 42 32 - 75 %    LYMPHOCYTES 44 12 - 49 %    MONOCYTES 13 5 - 13 %    EOSINOPHILS 0 0 - 7 %    BASOPHILS 1 0 - 1 %    IMMATURE GRANULOCYTES 0 0.0 - 0.5 %    ABS. NEUTROPHILS 1.2 (L) 1.8 - 8.0 K/UL    ABS. LYMPHOCYTES 1.2 0.8 - 3.5 K/UL    ABS. MONOCYTES 0.4 0.0 - 1.0 K/UL    ABS. EOSINOPHILS 0.0 0.0 - 0.4 K/UL    ABS. BASOPHILS 0.0 0.0 - 0.1 K/UL    ABS. IMM.  GRANS. 0.0 0.00 - 0.04 K/UL    DF AUTOMATED     METABOLIC PANEL, COMPREHENSIVE   Result Value Ref Range    Sodium 136 136 - 145 mmol/L    Potassium 4.0 3.5 - 5.1 mmol/L    Chloride 96 (L) 97 - 108 mmol/L    CO2 27 21 - 32 mmol/L    Anion gap 13 5 - 15 mmol/L    Glucose 79 65 - 100 mg/dL    BUN 14 6 - 20 MG/DL    Creatinine 1.30 0.70 - 1.30 MG/DL    BUN/Creatinine ratio 11 (L) 12 - 20      GFR est AA >60 >60 ml/min/1.73m2    GFR est non-AA 57 (L) >60 ml/min/1.73m2    Calcium 8.8 8.5 - 10.1 MG/DL    Bilirubin, total 0.4 0.2 - 1.0 MG/DL    ALT (SGPT) 46 12 - 78 U/L    AST (SGOT) 139 (H) 15 - 37 U/L    Alk. phosphatase 76 45 - 117 U/L    Protein, total 8.7 (H) 6.4 - 8.2 g/dL    Albumin 3.7 3.5 - 5.0 g/dL    Globulin 5.0 (H) 2.0 - 4.0 g/dL    A-G Ratio 0.7 (L) 1.1 - 2.2     LIPASE   Result Value Ref Range    Lipase 122 73 - 393 U/L   ETHYL ALCOHOL   Result Value Ref Range    ALCOHOL(ETHYL),SERUM 435 (H) <10 MG/DL   DRUG SCREEN, URINE   Result Value Ref Range    AMPHETAMINES NEGATIVE  NEG      BARBITURATES NEGATIVE  NEG      BENZODIAZEPINES NEGATIVE  NEG      COCAINE NEGATIVE  NEG      METHADONE NEGATIVE  NEG      OPIATES NEGATIVE  NEG      PCP(PHENCYCLIDINE) NEGATIVE  NEG      THC (TH-CANNABINOL) POSITIVE (A) NEG      Drug screen comment (NOTE)    URINALYSIS W/ REFLEX CULTURE   Result Value Ref Range    Color YELLOW/STRAW      Appearance CLEAR CLEAR      Specific gravity 1.010 1.003 - 1.030      pH (UA) 5.0 5.0 - 8.0      Protein 100 (A) NEG mg/dL    Glucose NEGATIVE  NEG mg/dL    Ketone NEGATIVE  NEG mg/dL    Bilirubin NEGATIVE  NEG      Blood TRACE (A) NEG      Urobilinogen 0.2 0.2 - 1.0 EU/dL    Nitrites NEGATIVE  NEG      Leukocyte Esterase NEGATIVE  NEG      WBC 0-4 0 - 4 /hpf    RBC 0-5 0 - 5 /hpf    Epithelial cells FEW FEW /lpf    Bacteria NEGATIVE  NEG /hpf    UA:UC IF INDICATED CULTURE NOT INDICATED BY UA RESULT CNI     TROPONIN I   Result Value Ref Range    Troponin-I, Qt. <0.05 <0.05 ng/mL   LACTIC ACID   Result Value Ref Range    Lactic acid 4.2 (HH) 0.4 - 2.0 MMOL/L   CK W/ REFLX CKMB   Result Value Ref Range    CK 1,273 (H) 39 - 308 U/L   CK-MB,QUANT.    Result Value Ref Range    CK - MB 2.4 <3.6 NG/ML    CK-MB Index 0.2 0.0 - 2.5     MAGNESIUM   Result Value Ref Range    Magnesium 1.6 1.6 - 2.4 mg/dL   LACTIC ACID   Result Value Ref Range    Lactic acid 3.2 (HH) 0.4 - 2.0 MMOL/L   LACTIC ACID   Result Value Ref Range    Lactic acid 2.6 (HH) 0.4 - 2.0 MMOL/L   METABOLIC PANEL, BASIC   Result Value Ref Range    Sodium 140 136 - 145 mmol/L Potassium 5.3 (H) 3.5 - 5.1 mmol/L    Chloride 103 97 - 108 mmol/L    CO2 23 21 - 32 mmol/L    Anion gap 14 5 - 15 mmol/L    Glucose 68 65 - 100 mg/dL    BUN 13 6 - 20 MG/DL    Creatinine 0.76 0.70 - 1.30 MG/DL    BUN/Creatinine ratio 17 12 - 20      GFR est AA >60 >60 ml/min/1.73m2    GFR est non-AA >60 >60 ml/min/1.73m2    Calcium 8.1 (L) 8.5 - 10.1 MG/DL   CBC W/O DIFF   Result Value Ref Range    WBC 2.0 (L) 4.1 - 11.1 K/uL    RBC 3.45 (L) 4.10 - 5.70 M/uL    HGB 10.5 (L) 12.1 - 17.0 g/dL    HCT 31.0 (L) 36.6 - 50.3 %    MCV 89.9 80.0 - 99.0 FL    MCH 30.4 26.0 - 34.0 PG    MCHC 33.9 30.0 - 36.5 g/dL    RDW 15.5 (H) 11.5 - 14.5 %    PLATELET 540 (L) 240 - 400 K/uL    MPV 11.4 8.9 - 12.9 FL    NRBC 0.0 0  WBC    ABSOLUTE NRBC 0.00 0.00 - 0.01 K/uL   CK   Result Value Ref Range     (H) 39 - 308 U/L   LACTIC ACID   Result Value Ref Range    Lactic acid 1.8 0.4 - 2.0 MMOL/L   METABOLIC PANEL, COMPREHENSIVE   Result Value Ref Range    Sodium 134 (L) 136 - 145 mmol/L    Potassium 5.3 (H) 3.5 - 5.1 mmol/L    Chloride 98 97 - 108 mmol/L    CO2 27 21 - 32 mmol/L    Anion gap 9 5 - 15 mmol/L    Glucose 122 (H) 65 - 100 mg/dL    BUN 15 6 - 20 MG/DL    Creatinine 1.14 0.70 - 1.30 MG/DL    BUN/Creatinine ratio 13 12 - 20      GFR est AA >60 >60 ml/min/1.73m2    GFR est non-AA >60 >60 ml/min/1.73m2    Calcium 8.3 (L) 8.5 - 10.1 MG/DL    Bilirubin, total 0.7 0.2 - 1.0 MG/DL    ALT (SGPT) 38 12 - 78 U/L    AST (SGOT) 112 (H) 15 - 37 U/L    Alk.  phosphatase 89 45 - 117 U/L    Protein, total 7.0 6.4 - 8.2 g/dL    Albumin 2.7 (L) 3.5 - 5.0 g/dL    Globulin 4.3 (H) 2.0 - 4.0 g/dL    A-G Ratio 0.6 (L) 1.1 - 2.2     CK   Result Value Ref Range     (H) 39 - 306 U/L   METABOLIC PANEL, BASIC   Result Value Ref Range    Sodium 134 (L) 136 - 145 mmol/L    Potassium 3.6 3.5 - 5.1 mmol/L    Chloride 99 97 - 108 mmol/L    CO2 27 21 - 32 mmol/L    Anion gap 8 5 - 15 mmol/L    Glucose 113 (H) 65 - 100 mg/dL    BUN 12 6 - 20 MG/DL    Creatinine 0.88 0.70 - 1.30 MG/DL    BUN/Creatinine ratio 14 12 - 20      GFR est AA >60 >60 ml/min/1.73m2    GFR est non-AA >60 >60 ml/min/1.73m2    Calcium 8.4 (L) 8.5 - 10.1 MG/DL   CBC WITH MANUAL DIFF   Result Value Ref Range    WBC 4.2 4.1 - 11.1 K/uL    RBC 3.65 (L) 4.10 - 5.70 M/uL    HGB 11.0 (L) 12.1 - 17.0 g/dL    HCT 33.3 (L) 36.6 - 50.3 %    MCV 91.2 80.0 - 99.0 FL    MCH 30.1 26.0 - 34.0 PG    MCHC 33.0 30.0 - 36.5 g/dL    RDW 15.3 (H) 11.5 - 14.5 %    PLATELET  050 - 585 K/uL     UNABLE TO REPORT ACCURATE COUNT DUE TO PLATELET AGGREGATION, HOWEVER, PLATELETS APPEAR DECREASED IN NUMBER ON SMEAR. PLEASE RESUBMIT SODIUM CITRATE (BLUE) AND EDTA (LAVENDAR) TUBES FOR HEMATOLOGICAL TESTING. NRBC 0.0 0  WBC    ABSOLUTE NRBC 0.00 0.00 - 0.01 K/uL    NEUTROPHILS 69 32 - 75 %    BAND NEUTROPHILS 0 0 - 6 %    LYMPHOCYTES 25 12 - 49 %    MONOCYTES 4 (L) 5 - 13 %    EOSINOPHILS 2 0 - 7 %    BASOPHILS 0 0 - 1 %    METAMYELOCYTES 0 0 %    MYELOCYTES 0 0 %    PROMYELOCYTES 0 0 %    BLASTS 0 0 %    OTHER CELL 0 0      IMMATURE GRANULOCYTES 0 %    ABS. NEUTROPHILS 2.8 1.8 - 8.0 K/UL    ABS. LYMPHOCYTES 1.1 0.8 - 3.5 K/UL    ABS. MONOCYTES 0.2 0.0 - 1.0 K/UL    ABS. EOSINOPHILS 0.1 0.0 - 0.4 K/UL    ABS. BASOPHILS 0.0 0.0 - 0.1 K/UL    ABS. IMM.  GRANS. 0.0 K/UL    DF SMEAR SCANNED      RBC COMMENTS ANISOCYTOSIS  1+       METABOLIC PANEL, BASIC   Result Value Ref Range    Sodium 136 136 - 145 mmol/L    Potassium 3.7 3.5 - 5.1 mmol/L    Chloride 99 97 - 108 mmol/L    CO2 27 21 - 32 mmol/L    Anion gap 10 5 - 15 mmol/L    Glucose 108 (H) 65 - 100 mg/dL    BUN 11 6 - 20 MG/DL    Creatinine 0.83 0.70 - 1.30 MG/DL    BUN/Creatinine ratio 13 12 - 20      GFR est AA >60 >60 ml/min/1.73m2    GFR est non-AA >60 >60 ml/min/1.73m2    Calcium 9.2 8.5 - 10.1 MG/DL   MAGNESIUM   Result Value Ref Range    Magnesium 1.0 (L) 1.6 - 2.4 mg/dL   PHOSPHORUS   Result Value Ref Range    Phosphorus 3.7 2.6 - 4.7 MG/DL   CBC W/O DIFF   Result Value Ref Range    WBC 4.8 4.1 - 11.1 K/uL    RBC 3.32 (L) 4.10 - 5.70 M/uL    HGB 10.2 (L) 12.1 - 17.0 g/dL    HCT 30.3 (L) 36.6 - 50.3 %    MCV 91.3 80.0 - 99.0 FL    MCH 30.7 26.0 - 34.0 PG    MCHC 33.7 30.0 - 36.5 g/dL    RDW 15.2 (H) 11.5 - 14.5 %    PLATELET 222 091 - 422 K/uL    MPV 12.0 8.9 - 12.9 FL    NRBC 0.4 (H) 0  WBC    ABSOLUTE NRBC 0.02 (H) 0.00 - 0.01 K/uL   CBC W/O DIFF   Result Value Ref Range    WBC 4.7 4.1 - 11.1 K/uL    RBC 3.29 (L) 4.10 - 5.70 M/uL    HGB 9.9 (L) 12.1 - 17.0 g/dL    HCT 29.7 (L) 36.6 - 50.3 %    MCV 90.3 80.0 - 99.0 FL    MCH 30.1 26.0 - 34.0 PG    MCHC 33.3 30.0 - 36.5 g/dL    RDW 15.0 (H) 11.5 - 14.5 %    PLATELET 97 (L) 025 - 400 K/uL    MPV 12.2 8.9 - 12.9 FL    NRBC 0.0 0  WBC    ABSOLUTE NRBC 0.00 0.00 - 0.01 K/uL   MAGNESIUM   Result Value Ref Range    Magnesium 1.3 (L) 1.6 - 2.4 mg/dL   METABOLIC PANEL, COMPREHENSIVE   Result Value Ref Range    Sodium 134 (L) 136 - 145 mmol/L    Potassium 4.0 3.5 - 5.1 mmol/L    Chloride 98 97 - 108 mmol/L    CO2 26 21 - 32 mmol/L    Anion gap 10 5 - 15 mmol/L    Glucose 92 65 - 100 mg/dL    BUN 17 6 - 20 MG/DL    Creatinine 0.84 0.70 - 1.30 MG/DL    BUN/Creatinine ratio 20 12 - 20      GFR est AA >60 >60 ml/min/1.73m2    GFR est non-AA >60 >60 ml/min/1.73m2    Calcium 9.1 8.5 - 10.1 MG/DL    Bilirubin, total 0.5 0.2 - 1.0 MG/DL    ALT (SGPT) 27 12 - 78 U/L    AST (SGOT) 57 (H) 15 - 37 U/L    Alk.  phosphatase 62 45 - 117 U/L    Protein, total 7.2 6.4 - 8.2 g/dL    Albumin 2.7 (L) 3.5 - 5.0 g/dL    Globulin 4.5 (H) 2.0 - 4.0 g/dL    A-G Ratio 0.6 (L) 1.1 - 2.2     TSH 3RD GENERATION   Result Value Ref Range    TSH 2.76 0.36 - 3.74 uIU/mL   CK   Result Value Ref Range     39 - 308 U/L   HEPATIC FUNCTION PANEL   Result Value Ref Range    Protein, total 7.2 6.4 - 8.2 g/dL    Albumin 2.8 (L) 3.5 - 5.0 g/dL    Globulin 4.4 (H) 2.0 - 4.0 g/dL    A-G Ratio 0.6 (L) 1.1 - 2.2      Bilirubin, total 0.3 0.2 - 1.0 MG/DL    Bilirubin, direct 0.2 0.0 - 0.2 MG/DL    Alk. phosphatase 63 45 - 117 U/L    AST (SGOT) 55 (H) 15 - 37 U/L    ALT (SGPT) 26 12 - 78 U/L   PLATELET COUNT   Result Value Ref Range    PLATELET 979 (L) 729 - 475 K/uL   METABOLIC PANEL, COMPREHENSIVE   Result Value Ref Range    Sodium 133 (L) 136 - 145 mmol/L    Potassium 3.8 3.5 - 5.1 mmol/L    Chloride 98 97 - 108 mmol/L    CO2 26 21 - 32 mmol/L    Anion gap 9 5 - 15 mmol/L    Glucose 98 65 - 100 mg/dL    BUN 23 (H) 6 - 20 MG/DL    Creatinine 0.95 0.70 - 1.30 MG/DL    BUN/Creatinine ratio 24 (H) 12 - 20      GFR est AA >60 >60 ml/min/1.73m2    GFR est non-AA >60 >60 ml/min/1.73m2    Calcium 9.1 8.5 - 10.1 MG/DL    Bilirubin, total 0.4 0.2 - 1.0 MG/DL    ALT (SGPT) 26 12 - 78 U/L    AST (SGOT) 49 (H) 15 - 37 U/L    Alk. phosphatase 67 45 - 117 U/L    Protein, total 7.2 6.4 - 8.2 g/dL    Albumin 2.8 (L) 3.5 - 5.0 g/dL    Globulin 4.4 (H) 2.0 - 4.0 g/dL    A-G Ratio 0.6 (L) 1.1 - 2.2     MAGNESIUM   Result Value Ref Range    Magnesium 1.8 1.6 - 2.4 mg/dL   VITAMIN B12   Result Value Ref Range    Vitamin B12 522 193 - 986 pg/mL   HOMOCYSTEINE, PLASMA   Result Value Ref Range    Homocysteine, plasma 19.3 (H) 3.7 - 13.9 umol/L   GLUCOSE, POC   Result Value Ref Range    Glucose (POC) 76 65 - 100 mg/dL    Performed by Lynae Holstein    EKG, 12 LEAD, INITIAL   Result Value Ref Range    Ventricular Rate 93 BPM    Atrial Rate 93 BPM    P-R Interval 180 ms    QRS Duration 80 ms    Q-T Interval 376 ms    QTC Calculation (Bezet) 467 ms    Calculated P Axis 40 degrees    Calculated R Axis 40 degrees    Calculated T Axis 52 degrees    Diagnosis       Normal sinus rhythm  minor ST changes likely due to partially manifest early repolarization  no significant interval change  Confirmed by Isaac Sosa MD, [facility] ([id number]) on 12/29/2018 10:53:39 PM         PHYSICAL EXAM  Visit Vitals  /90 (BP 1 Location: Left arm, BP Patient Position: Supine; At rest)   Pulse 94   Temp 99 °F (37.2 °C)   Resp 25   Ht 5' 9\" (1.753 m)   Wt 172 lb (78 kg)   SpO2 94%   BMI 25.40 kg/m²     General:  Alert, cooperative, no distress. Head:  Normocephalic, without obvious abnormality, atraumatic. Eyes:  Conjunctivae/corneas clear. Pupils equal, round, reactive to light. Extraocular movements intact, VFF, NO papilledema   Lungs:  Heart:   Non labored breathing  Regular rate and rhythm, no carotid bruits   Abdomen:   Soft, non-distended   Extremities: Extremities normal, atraumatic, no cyanosis or edema. Pulses: 2+ and symmetric all extremities. Skin: Skin color, texture, turgor normal. No rashes or lesions. Neurologic:  Gen: Attention normal             Language: naming, repetition, fluency normal             Memory: intact recent and remote memory  Cranial Nerves:  I: smell Not tested   II: visual fields Full to confrontation   II: pupils Equal, round, reactive to light   II: optic disc No papilledema   III,VII: ptosis none   III,IV,VI: extraocular muscles  Full ROM   V: mastication normal   V: facial light touch sensation  normal   VII: facial muscle function   symmetric   VIII: hearing symmetric   IX: soft palate elevation  normal   XI: trapezius strength  5/5   XI: sternocleidomastoid strength 5/5   XI: neck flexion strength  5/5   XII: tongue  midline     Motor: normal bulk and tone,  Tremor+              Strength: 5/5 all four extremities  Sensory: intact to LT, PP, vibration, and temperature  Coordination: FTN and HTS abnormal,  Gait: Deferred  Reflexes: 3+ throughout       IMPRESSION: This is a 40-year-old right-handed black man who was admitted with syncopal episode generalized body pain, history of EtOH abuse. 1.  We are dealing with most likely alcohol induced seizure  2. Altered mental status secondary to EtOH abuse  3. Frequent falls  4. Neuropathy most likely secondary to EtOH abuse  5. Rule out cerebrovascular accident  6. Impending DVT  7. Tremor. RECOMMENDATIONS:  1.  MRI brain  2. EEG  3. Telemetry  4. DT precaution  5. Vitamin B 100 mg p.o. daily  6. Start ASA 81mg daily  7. EMG/NCS all extremities as out patient  8. ST/OT/PT eval ordered and will assess patient for rehab  9. Motrin 600 mg p.o. as needed for pain  10. Seizure precaution  11. VTE prophylaxis:       Thank you very much for this consultation.

## 2019-01-04 NOTE — PROGRESS NOTES
1/4/2019 RE: Bright Izaguirre To Whom it May Concern: This is to certify that Bright Izaguirre was admitted to [facility] on 12/27/2018 and remains in the hospital today 1/4/2018 there is not anticipated discharge date for him at this time. Please feel free to contact my office if you have any questions or concerns. Thank you for your assistance in this matter.  
 
Sincerely, 
 
 
Nichelle Cohen, DO

## 2019-01-04 NOTE — PROGRESS NOTES
.. TRANSFER - OUT REPORT: 
 
Verbal report given to Soniya Hampton [phone] (name) on Nilay Schuler  being transferred to [facility] (unit) for Rehab Report consisted of patients Situation, Background, Assessment and  
Recommendations(SBAR). Information from the following report(s) SBAR, Kardex, MAR, Accordion and Recent Results was reviewed with the receiving nurse. Opportunity for questions and clarification was provided.

## 2019-01-04 NOTE — INTERDISCIPLINARY ROUNDS
IDR with Dr. Mariely Tian (MD), Ro Moran (pharmacist), Luis Guthrie (), [facility] (nurse manager), Zulay Hancock (diabetes educator), and India Abel (RN) to discuss plan of care including pt at MRI now, discontinue orthostatic BP's, plan for rehab

## 2019-01-07 ENCOUNTER — TELEPHONE (OUTPATIENT)
Dept: CASE MANAGEMENT | Age: 54
End: 2019-01-07

## 2019-01-07 NOTE — TELEPHONE ENCOUNTER
Case Management Follow-up call  CM reviewed chart. CM called Jodi to follow-up and make sure nothing else is needed for pt. Rylee confirmed the pt is doing well, nothing else is needed. CM called pt sister, sister stated things were going well and pt was going to be discharged from the rehabilitation program soon. CM encouraged sister to ask the staff taking care of pt at the nursing home to discuss her pt care. No further questions at this time.       [employer] MSW, QMHP

## 2019-01-08 ENCOUNTER — PATIENT OUTREACH (OUTPATIENT)
Dept: CASE MANAGEMENT | Age: 54
End: 2019-01-08

## 2019-01-08 LAB
ATRIAL RATE: 85 BPM
ATRIAL RATE: 90 BPM
CALCULATED P AXIS, ECG09: 32 DEGREES
CALCULATED P AXIS, ECG09: 42 DEGREES
CALCULATED R AXIS, ECG10: 16 DEGREES
CALCULATED R AXIS, ECG10: 17 DEGREES
CALCULATED T AXIS, ECG11: 21 DEGREES
CALCULATED T AXIS, ECG11: 28 DEGREES
DIAGNOSIS, 93000: NORMAL
DIAGNOSIS, 93000: NORMAL
P-R INTERVAL, ECG05: 180 MS
P-R INTERVAL, ECG05: 184 MS
Q-T INTERVAL, ECG07: 350 MS
Q-T INTERVAL, ECG07: 368 MS
QRS DURATION, ECG06: 66 MS
QRS DURATION, ECG06: 74 MS
QTC CALCULATION (BEZET), ECG08: 428 MS
QTC CALCULATION (BEZET), ECG08: 437 MS
VENTRICULAR RATE, ECG03: 85 BPM
VENTRICULAR RATE, ECG03: 90 BPM

## 2019-01-08 NOTE — PROGRESS NOTES
Community Care Team documentation for patient in [facility] Initial Follow Up Hospital Admission and Diagnosis: [facility] 12/27/18 - 1/4/19  Alcohol intoxication delirium with moderate or severe use disorder Patient was discharged to [facility]. RRAT score: 21 Advance Care Planning:  Not on file. PCP : Nancy White MD  
 
SNF Attending:  Sachi Chin MD 
 
Spoke with SNF team.  Ensured patient arrived to SNF safely with admission packet in order. Provided upcoming and needed follow up appointments; Neurology Didier Moore MD on 1/15/19 at 1:30 PM; [facility] in 1 week for Management of presbyesophagus on esophagram; PCP Dr. Taisha Alejo after SNF discharge on 1/18; Addiction Recovery support Warm Line -[phone], Talked to train individuals with lived experience in addiction recovery, [facility], 8:00AM - 12:00 Midnight 7 days/week. Pt is receiving skilled PT only 6X a week. Currently moderate falls risk. Six minute walk completed- O2 not needed at this time. Disposition meeting to be held tomorrow. Per chart, PTA pt lived with niece. UAI was completed in hospital. SNF SW to follow up with intake regarding copy of UAI. Anticipate plan to discharge 1/16. Community Care Team will follow up weekly with [facility] until discharge. Medications were not reconciled and general patient assessment was not completed during this [facility] outreach.

## 2019-01-15 ENCOUNTER — PATIENT OUTREACH (OUTPATIENT)
Dept: CASE MANAGEMENT | Age: 54
End: 2019-01-15

## 2019-01-15 NOTE — PROGRESS NOTES
Community Care Team Documentation for Patient in [facility] Subsequent Follow up Patient remains at [facility] ([facility]). See previous [facility] Team notes. PCP : Nic Garrison MD 
 
Spoke with SNF team.  PT/OT continue. Discharge set for 1/16/19. No home health or DME recommendations. Pt and family do not feel that he needs personal care right now as he is ambulatory without an AD. Family states pt can care for himself and they will check in on him daily. Medications were not reconciled and general patient assessment was not completed during this skilled nursing facility outreach.

## 2019-01-22 ENCOUNTER — PATIENT OUTREACH (OUTPATIENT)
Dept: CASE MANAGEMENT | Age: 54
End: 2019-01-22

## 2019-01-22 ENCOUNTER — OFFICE VISIT (OUTPATIENT)
Dept: NEUROLOGY | Age: 54
End: 2019-01-22

## 2019-01-22 VITALS
SYSTOLIC BLOOD PRESSURE: 98 MMHG | DIASTOLIC BLOOD PRESSURE: 82 MMHG | OXYGEN SATURATION: 98 % | WEIGHT: 183 LBS | HEART RATE: 112 BPM | BODY MASS INDEX: 27.02 KG/M2 | RESPIRATION RATE: 14 BRPM

## 2019-01-22 DIAGNOSIS — M48.061 SPINAL STENOSIS OF LUMBAR REGION, UNSPECIFIED WHETHER NEUROGENIC CLAUDICATION PRESENT: Primary | ICD-10-CM

## 2019-01-22 NOTE — PROGRESS NOTES
Community Care Team Documentation for Patient in [facility] Discharge Note Patient has been discharged from [address] [facility] ([facility] See previous [facility] Team notes. PCP : Parth Castañeda MD 
 
Spoke with SNF team.  Confirmed pt discharged 1/16/19. No home health or DME recommendations. Community Care Team will sign off at this time. Medications were not reconciled and general patient assessment was not completed during this skilled nursing facility outreach.

## 2019-01-22 NOTE — PROGRESS NOTES
Day of hospitalization, pt said he was coming home and had blacked out. That's the last thing he remembers. Jovanni Sans somehow, and now has ongoing pain in R leg when he stands after sitting for an extended period. Feels good after rehab and detox, no desire to drink and only problems are pain in back and leg.

## 2019-01-23 NOTE — PROGRESS NOTES
Date:  19 Name:  Nikos Alcala 
:  1965 MRN:  [id number] PCP:  Ginger Meneses MD 
 
No chief complaint on file. HISTORY OF PRESENT ILLNESS: Hospital follow up visit. He was admitted to the hospital for a syncopal event. Per ER notes syncopal event was related to alcohol use. He did, however, report that his legs will just give out and he continues to have right-sided leg pain when standing after sitting for a long period of time. No bowel or bladder complication. After discharged he went to inpatient rehab. He reports that he has been free of alcohol since hospital admission. No other syncopal events. MRI of the Cervical and lumbar spine were completed and showed. Lumbar Spine IMPRESSION: 
Mild to level disc and facet degenerative change with congenital narrowing of 
the lumbar canal and minimal spondylolisthesis. 
  
Severe canal stenosis and moderate bilateral foraminal stenosis at L4-5. 
  
Moderate canal severe left and moderate right foraminal stenosis at L5-S1. Discussed in full and patient verbalized understanding. Cervical spine IMPRESSION: 
Multilevel disc and facet degenerative change most pronounced at C5-C6. 
  
There is moderate canal and severe bilateral foraminal stenosis at C5-6. 
  
Mild canal and moderate to severe bilateral foraminal stenosis at C4-5 Discussed in full and patient verbalized understanding Current Outpatient Medications Medication Sig  
 folic acid-vit S6-LAQ M71 (FOLTX) 2.5-25-2 mg tablet Take 1 Tab by mouth daily.  gabapentin (NEURONTIN) 300 mg capsule Take 1 Cap by mouth three (3) times daily.  metoprolol tartrate (LOPRESSOR) 25 mg tablet Take 1 Tab by mouth every twelve (12) hours.  pantoprazole (PROTONIX) 40 mg tablet Take 1 Tab by mouth Daily (before breakfast).  thiamine HCL (B-1) 100 mg tablet Take 1 Tab by mouth daily.   
 aluminum-magnesium hydroxide (MAALOX) 200-200 mg/5 mL suspension Take 15 mL by mouth four (4) times daily as needed for Indigestion. No current facility-administered medications for this visit. Allergies Allergen Reactions  Aspirin Nausea and Vomiting Past Medical History:  
Diagnosis Date  Alcohol abuse  Asthma  High blood pressure  Hypertension  Joint pain  Muscle pain  Recent change in weight  Snoring  Visual disturbance No past surgical history on file. Social History Socioeconomic History  Marital status: SINGLE Spouse name: Not on file  Number of children: Not on file  Years of education: Not on file  Highest education level: Not on file Social Needs  Financial resource strain: Not on file  Food insecurity - worry: Not on file  Food insecurity - inability: Not on file  Transportation needs - medical: Not on file  Transportation needs - non-medical: Not on file Occupational History  Not on file Tobacco Use  Smoking status: Current Every Day Smoker  Smokeless tobacco: Never Used Substance and Sexual Activity  Alcohol use: No  
  Frequency: Never  Drug use: No  
 Sexual activity: Not on file Other Topics Concern  Not on file Social History Narrative  Not on file No family history on file. PHYSICAL EXAMINATION:   
Visit Vitals BP 98/82 (BP 1 Location: Left arm, BP Patient Position: Sitting) Pulse (!) 112 Resp 14 Wt 83 kg (183 lb) SpO2 98% BMI 27.02 kg/m² Physical Exam  
Constitutional: He appears well-developed and well-nourished. HENT:  
Head: Normocephalic. Eyes: Conjunctivae, EOM and lids are normal. Pupils are equal, round, and reactive to light. Neck: Trachea normal and full passive range of motion without pain. Cardiovascular: Normal rate, regular rhythm, S1 normal, S2 normal and normal pulses. Pulmonary/Chest: Effort normal and breath sounds normal.  
Musculoskeletal: Normal range of motion. Neurological: He is alert. He has normal strength. He displays a negative Romberg sign. Reflex Scores: 
     Tricep reflexes are 2+ on the right side and 2+ on the left side. Bicep reflexes are 2+ on the right side and 2+ on the left side. Brachioradialis reflexes are 2+ on the right side and 2+ on the left side. Patellar reflexes are 2+ on the right side and 3+ on the left side. Achilles reflexes are 2+ on the right side and 2+ on the left side. Skin: Skin is warm, dry and intact. Psychiatric: He has a normal mood and affect. His speech is normal and behavior is normal. Judgment and thought content normal.  
 
 
ASSESSMENT AND PLAN 
  ICD-10-CM ICD-9-CM 1. Spinal stenosis of lumbar region, unspecified whether neurogenic claudication present M48.061 724.02 REFERRAL TO ORTHOPEDIC SURGERY  
   REFERRAL TO PHYSICAL THERAPY  
MRI imaging was discussed. Patient verbalized understanding. I will send him to Physical Therapy for conservative treatment . I will also refer him to orthopedic surgery to evaluate and treat. 18 minutes of this 25 minute office visit was spent in education and counseling regarding MRI results. This note will not be viewable in [address]  
Edilberto Steele NP

## 2019-03-13 ENCOUNTER — OFFICE VISIT (OUTPATIENT)
Dept: NEUROLOGY | Age: 54
End: 2019-03-13

## 2019-03-13 VITALS
OXYGEN SATURATION: 98 % | TEMPERATURE: 97.6 F | DIASTOLIC BLOOD PRESSURE: 92 MMHG | SYSTOLIC BLOOD PRESSURE: 142 MMHG | RESPIRATION RATE: 14 BRPM | HEART RATE: 82 BPM | WEIGHT: 188 LBS | BODY MASS INDEX: 27.76 KG/M2

## 2019-03-13 DIAGNOSIS — M54.16 LUMBAR RADICULOPATHY: ICD-10-CM

## 2019-03-13 DIAGNOSIS — R20.2 PARESTHESIA: ICD-10-CM

## 2019-03-13 DIAGNOSIS — G40.909 SEIZURE DISORDER (HCC): ICD-10-CM

## 2019-03-13 DIAGNOSIS — R25.1 TREMOR OF BOTH HANDS: ICD-10-CM

## 2019-03-13 DIAGNOSIS — G62.9 NEUROPATHY: Primary | ICD-10-CM

## 2019-03-13 RX ORDER — GABAPENTIN 600 MG/1
600 TABLET ORAL 3 TIMES DAILY
Qty: 90 TAB | Refills: 2 | Status: SHIPPED | OUTPATIENT
Start: 2019-03-13

## 2019-03-13 RX ORDER — PREDNISONE 5 MG/1
5 TABLET ORAL 2 TIMES DAILY
Qty: 30 TAB | Refills: 0 | Status: SHIPPED | OUTPATIENT
Start: 2019-03-13

## 2019-03-13 NOTE — PATIENT INSTRUCTIONS
All test results will be discussed at the next appointment. Electromyogram (EMG) and Nerve Conduction Studies: About These Tests  What are they? An electromyogram (EMG) measures the electrical activity of your muscles when you are not using them (at rest) and when you tighten them (muscle contraction). Nerve conduction studies (NCS) measure how well and how fast the nerves can send electrical signals. EMG and nerve conduction studies are often done together. If they are done together, the nerve conduction studies are done before the EMG. Why are they done? You may need an EMG to find diseases that damage your muscles or nerves or to find why you cannot move your muscles (paralysis), why they feel weak, or why they twitch. You may need nerve conduction studies to find damage to the nerves that lead from the brain and spinal cord to the rest of the body (peripheral nervous system). Nerve conduction studies are often used to help find nerve disorders, such as carpal tunnel syndrome. How can you prepare for these tests? · Tell your doctors ALL the medicines, vitamins, supplements, and herbal remedies you take. Some medicines can affect the test results. You may need to stop taking some medicines before you have this test.     · If you take aspirin or some other blood thinner, be sure to talk to your doctor. He or she will tell you if you should stop taking it before your test. Make sure that you understand exactly what your doctor wants you to do.     · Wear loose-fitting clothing. You may be given a hospital gown to wear.     · The electrodes for the test are attached to your skin. Your skin needs to be clean and free of sprays, oils, creams, and lotions. What happens during the tests? You lie on a table or bed or sit in a reclining chair so your muscles are relaxed. For an EMG:  · Your doctor will insert a needle electrode into a muscle.  This will record the electrical activity while the muscle is at rest. You may feel a quick, sharp pain when the needle electrode is put into a muscle. · Your doctor will ask you to tighten the same muscle slowly and steadily while the electrical activity is recorded. · Your doctor may move the electrode to a different area of the muscle or a different muscle. For nerve conduction studies:  · Your doctor will attach two types of electrodes to your skin. ? One type of electrode is placed over a nerve and will give the nerve an electrical pulse. ? The other type of electrode is placed over the muscle that the nerve controls. It will record how long it takes the muscle to react to the electrical pulse. · You will be able to feel the electrical pulses. They are small shocks and are safe. What else should you know about these tests? · After an EMG, you may be sore and have a tingling feeling in your muscles for up to 2 days. You may have small bruises or swelling at the needle site. · For an EMG, you may be asked to sign a consent form. Talk to your doctor about any concerns you have about the need for the test, its risks, how it will be done, or what the results will mean. How long do they take? · An EMG may take 30 to 60 minutes. · Nerve conduction tests may take from 15 minutes to 1 hour or more. It depends on how many nerves and muscles your doctor tests. What happens after these tests? · If any of the test areas are sore:  ? Put ice or a cold pack on the area for 10 to 20 minutes at a time. Put a thin cloth between the ice and your skin. ? Take an over-the-counter pain medicine, such as acetaminophen (Tylenol), ibuprofen (Advil, Motrin), or naproxen (Aleve). Be safe with medicines. Read and follow all instructions on the label. · You will probably be able to go home right away. · You can go back to your usual activities right away. When should you call for help?   Watch closely for changes in your health, and be sure to contact your doctor if:  · Muscle pain from an EMG test gets worse or you have swelling, tenderness, or pus at any of the needle sites. · You have any problems that you think may be from the test.  · You have any questions about the test or have not received your results. Follow-up care is a key part of your treatment and safety. Be sure to make and go to all appointments, and call your doctor if you are having problems. It's also a good idea to keep a list of the medicines you take. Ask your doctor when you can expect to have your test results. Where can you learn more? Go to http://brittany-luis.info/. Enter F079 in the search box to learn more about \"Electromyogram (EMG) and Nerve Conduction Studies: About These Tests. \"  Current as of: Mylene 3, 2018  Content Version: 11.9  © 4839-4111 [employer]. Care instructions adapted under license by [employer] (which disclaims liability or warranty for this information). If you have questions about a medical condition or this instruction, always ask your healthcare professional. [address] any warranty or liability for your use of this information.

## 2019-03-13 NOTE — PROGRESS NOTES
Neurology Progress Note    NAME:  Renée Oliver   :   1965   MRN:   [id number]     Date/Time:  3/13/2019  Subjective: Renée Oliver is a 62 y.o. male here today for blackout spells, frequent falls, back pain, spinal spondylosis. Patient was previously seen by the nurse practitioner after hospital discharge, was sent to physical therapy. Patient today says he has reduced his alcohol drinking, since then has not had any blackout, no fall reported. He says he has been to physical therapy and appeared to have been helping his back pain, however, he continues to have numbness and tingling sensation of the extremities. Back pain is sharp in nature, intermittent, goes down to the legs, causing numbness and tingling sensation and weakness of the legs. He says he is also having neck pain which is sharp in nature, intermittent, aggravated by movement of the neck, pain goes down to the arms and hands causing the weakness of the extremity, sometimes he tends to drop things. He noted that physical therapy has had helped with the pain. Patient denies difficulty swallowing, breathing, or odynophagia. Patient was supposed to obtain EMG/nerve conduction studies of the extremities but that has not been down. I will obtain nerve conduction studies of all extremities to evaluate for neuropathy. Neurontin 600 mg p.o. 3 times daily, will give patient a short course of steroid 5 mg prednisone p.o. twice daily for 10 days.   Review of Systems - General ROS: positive for  - fatigue, night sweats and sleep disturbance  Psychological ROS: positive for - anxiety, concentration difficulties, depression, memory difficulties, mood swings and sleep disturbances  Ophthalmic ROS: positive for - blurry vision  ENT ROS: positive for - headaches, vertigo and visual changes  Allergy and Immunology ROS: negative  Hematological and Lymphatic ROS: negative  Endocrine ROS: negative  Respiratory ROS: no cough, shortness of breath, or wheezing  Cardiovascular ROS: no chest pain or dyspnea on exertion  Gastrointestinal ROS: no abdominal pain, change in bowel habits, or black or bloody stools  Genito-Urinary ROS: no dysuria, trouble voiding, or hematuria  Musculoskeletal ROS: positive for - gait disturbance, joint pain, joint stiffness, muscle pain and muscular weakness  Neurological ROS: positive for - dizziness, gait disturbance, headaches, impaired coordination/balance, numbness/tingling, seizures, tremors, visual changes and weakness  Dermatological ROS: negative        Medications reviewed:  Current Outpatient Medications   Medication Sig Dispense Refill    gabapentin (NEURONTIN) 600 mg tablet Take 1 Tab by mouth three (3) times daily. For Trigeminal Neuralgia 90 Tab 2    predniSONE (DELTASONE) 5 mg tablet Take 1 Tab by mouth two (2) times a day. 30 Tab 0    folic acid-vit C2-ACX D98 (FOLTX) 2.5-25-2 mg tablet Take 1 Tab by mouth daily. 30 Tab 3    pantoprazole (PROTONIX) 40 mg tablet Take 1 Tab by mouth Daily (before breakfast). 30 Tab 3    thiamine HCL (B-1) 100 mg tablet Take 1 Tab by mouth daily. 30 Tab 3    metoprolol tartrate (LOPRESSOR) 25 mg tablet Take 1 Tab by mouth every twelve (12) hours. 30 Tab 3    aluminum-magnesium hydroxide (MAALOX) 200-200 mg/5 mL suspension Take 15 mL by mouth four (4) times daily as needed for Indigestion.  3 mL 3        Objective:   Vitals:  Vitals:    03/13/19 1357   BP: (!) 142/92   Pulse: 82   Resp: 14   Temp: 97.6 °F (36.4 °C)   SpO2: 98%   Weight: 188 lb (85.3 kg)   PainSc:   0 - No pain               Lab Data Reviewed:  Lab Results   Component Value Date/Time    WBC 4.7 12/31/2018 03:14 AM    HCT 29.7 (L) 12/31/2018 03:14 AM    HGB 9.9 (L) 12/31/2018 03:14 AM    PLATELET 011 (L) 29/84/4631 09:52 PM       Lab Results   Component Value Date/Time    Sodium 133 (L) 01/01/2019 03:54 AM    Potassium 3.8 01/01/2019 03:54 AM    Chloride 98 01/01/2019 03:54 AM    CO2 26 01/01/2019 03:54 AM    Glucose 98 01/01/2019 03:54 AM    BUN 23 (H) 01/01/2019 03:54 AM    Creatinine 0.95 01/01/2019 03:54 AM    Calcium 9.1 01/01/2019 03:54 AM       No components found for: TROPQUANT    No results found for: PALMA      No results found for: HBA1C, HGBE8, CVH4VOJR, AUD7VWYB, LMN7FFHV     Lab Results   Component Value Date/Time    Vitamin B12 522 01/01/2019 03:54 AM    Folate 32.6 (H) 07/14/2010 03:45 AM       No results found for: PALMA, ANARX, ANAIGG, XBANA    No results found for: CHOL, CHOLPOCT, CHOLX, CHLST, CHOLV, HDL, HDLPOC, LDL, LDLCPOC, LDLC, DLDLP, VLDLC, VLDL, TGLX, TRIGL, TRIGP, TGLPOCT, CHHD, CHHDX      CT Results (recent):  Results from [place] encounter on 12/27/18   CTA CHEST W OR W WO CONT    Narrative INDICATION: Acute chest pain    COMPARISON:None    TECHNIQUE:    Routine noncontrast imaging the chest was performed for localization purposes. Then, following the uneventful intravenous administration of 80 cc [id number],  thin helical axial images were obtained through the chest. 3D image  postprocessing was performed. CT dose reduction was achieved through use of a  standardized protocol tailored for this examination and automatic exposure  control for dose modulation. FINDINGS:    THYROID: No nodule. MEDIASTINUM: No mass or lymphadenopathy. ADE: No mass or lymphadenopathy. THORACIC AORTA: No dissection or aneurysm. PULMONARY ARTERIES: Main pulmonary artery is normal in caliber. No evidence of  acute pulmonary emboli. TRACHEA/BRONCHI: Patent. ESOPHAGUS: Dilated and fluid-filled. HEART: Normal in size. PLEURA: No effusion or pneumothorax. LUNGS: No nodule, mass, or airspace disease. INCIDENTALLY IMAGED UPPER ABDOMEN: No focal abnormality. BONES: No destructive bone lesion. ADDITIONAL COMMENTS: N/A      Impression IMPRESSION:  No evidence of acute pulmonary him most. Dilated, fluid-filled esophagus,  suggestive of gastroesophageal reflux or esophageal dysmotility.        MRI Results (recent):  Results from [place] encounter on 12/27/18   MRI LUMB SPINE W WO CONT    Narrative EXAM: MRI LUMB SPINE W WO CONT  Clinical history: Leg weakness  INDICATION: Leg weakness. COMPARISON: None    TECHNIQUE: MR imaging of the lumbar spine was performed using the following  sequences: sagittal T1, T2, STIR;  axial T1, T2 prior to and following contrast  administration. CONTRAST: 16 mL of Dotarem. FINDINGS:    Moderate to severe congenital narrowing of the lumbar canal. Epidural  lipomatosis. Minimal retrolisthesis L4-L5 and L5 on S1. Vertebral body heights  are maintained. No acute fracture or dislocation. Degenerative marrow signal  changes are noted. Abdominal aorta is normal in caliber. Proximal common iliac  vessels are normal in caliber as well. The conus medullaris terminates at L1-L2. Signal and caliber of the distal  spinal cord are within normal limits. There is no pathologic intrathecal  enhancement. The paraspinal soft tissues are within normal limits. Lower thoracic spine: No herniation or stenosis. L1-L2: No herniation or stenosis. L2-L3: No herniation or stenosis. L3-L4: Facet arthropathy and hypertrophy is greater on the right. Ligament  flavum hypertrophy greater on the right. Mild right foraminal protrusion. Mild  to moderate canal stenosis. Mild right foraminal stenosis. Effacement of nerve  roots extending to the right L4-5 foramen. L4-L5: Moderate facet arthropathy and hypertrophy. Ligament flavum hypertrophy. Moderate broad-based protrusion. Severe canal stenosis. Moderate bilateral  foraminal stenosis. Significant effacement of nerve roots extending to both the  right and left L5-S1 foramina. L5-S1: Facet arthropathy. Broad-based disc protrusion with annular ligament tear  at the right lateral recess. Moderate canal stenosis. Severe left foraminal  stenosis. Moderate right foraminal stenosis.  Effacement of nerve roots extending  to the right and left S1 foramina. No sacral fracture. Impression IMPRESSION:  Mild to level disc and facet degenerative change with congenital narrowing of  the lumbar canal and minimal spondylolisthesis. Severe canal stenosis and moderate bilateral foraminal stenosis at L4-5. Moderate canal severe left and moderate right foraminal stenosis at L5-S1. IR Results (recent):  No results found for this or any previous visit. VAS/US Results (recent):  No results found for this or any previous visit. PHYSICAL EXAM:  General:    Alert, cooperative, no distress, appears stated age. Head:   Normocephalic, without obvious abnormality, atraumatic. Eyes:   Conjunctivae/corneas clear. PERRLA  Nose:  Nares normal. No drainage or sinus tenderness. Throat:    Lips, mucosa, and tongue normal.  No Thrush  Neck:  Supple, symmetrical,  no adenopathy, thyroid: non tender    no carotid bruit and no JVD. Paraspinal tenderness  Back:    Symmetric, diffuse tenderness. Lungs:   Clear to auscultation bilaterally. No Wheezing or Rhonchi. No rales. Chest wall:  No tenderness or deformity. No Accessory muscle use. Heart:   Regular rate and rhythm,  no murmur, rub or gallop. Abdomen:   Soft, non-tender. Not distended. Bowel sounds normal. No masses  Extremities: Extremities normal, atraumatic, No cyanosis. No edema. No clubbing  Skin:     Texture, turgor normal. No rashes or lesions. Not Jaundiced  Lymph nodes: Cervical, supraclavicular normal.  Psych:  Good insight. Not depressed. Anxious . NEUROLOGICAL EXAM:  Appearance: The patient is well developed, well nourished, and is in no acute distress. Mental Status: Oriented to time, place and person. Mood and affect appropriate. Cranial Nerves:   Intact visual fields. Fundi are benign. BRET, EOM's full, no nystagmus, no ptosis. Facial sensation is normal. Corneal reflexes are intact. Facial movement is symmetric. Hearing is normal bilaterally.  Palate is midline with normal sternocleidomastoid and trapezius muscles are normal. Tongue is midline. Motor:  5-/5 strength in upper and lower proximal and distal muscles. Normal bulk and tone. No fasciculations. Reflexes:   Deep tendon reflexes 3+/4 and symmetrical.   Sensory:    Dysesthesia to touch, pinprick and vibration. Gait:   Slightly unsteady gait. Tremor:   Tremor noted. Cerebellar:  No cerebellar signs present. Neurovascular:  Normal heart sounds and regular rhythm, peripheral pulses intact, and no carotid bruits. Assesment  1. Neuropathy  Continue Neurontin  EMG/nerve conduction studies all extremities  2. Tremor of both hands  Stable    3. Seizure disorder ([facility])  Stable    4. Lumbar radiculopathy  Physical therapy    5. Paresthesia  Stable    ___________________________________________________  PLAN: Medication and plan discussed with patient      ICD-10-CM ICD-9-CM    1. Neuropathy G62.9 355.9 EMG NCV MOTOR WITH F/WAVE PER NERVE   2. Tremor of both hands R25.1 781.0    3. Seizure disorder ([address] G40.909 345.90    4. Lumbar radiculopathy M54.16 724.4 EMG NCV MOTOR WITH F/WAVE PER NERVE   5.  Paresthesia R20.2 782.0 EMG NCV MOTOR WITH F/WAVE PER NERVE     Follow-up and Dispositions    · Return in about 2 months (around 5/13/2019).                ___________________________________________________    Total time spent with patient:  []15   []25   []35   [] __ minutes    Care Plan discussed with:    []Patient   []Family    []Care Manager   []Consultant/Specialist :    ___________________________________________________    Attending Physician: Iban Early MD

## 2020-03-22 NOTE — INTERDISCIPLINARY ROUNDS
Patient treatment plan discuss. Patient lives with niece. Neuro consult for patient. MRI schedule later on this afternoon. Per PT note recommendation \"inpatient rehab pending\" CM to confirm for discharge planning. [address] [place] [address] 
[phone] no